# Patient Record
Sex: FEMALE | Race: WHITE | Employment: UNEMPLOYED | URBAN - METROPOLITAN AREA
[De-identification: names, ages, dates, MRNs, and addresses within clinical notes are randomized per-mention and may not be internally consistent; named-entity substitution may affect disease eponyms.]

---

## 2017-01-16 ENCOUNTER — APPOINTMENT (OUTPATIENT)
Dept: ULTRASOUND IMAGING | Facility: CLINIC | Age: 42
End: 2017-01-16
Attending: OBSTETRICS & GYNECOLOGY
Payer: COMMERCIAL

## 2017-01-16 PROCEDURE — 76819 FETAL BIOPHYS PROFIL W/O NST: CPT

## 2017-01-24 DIAGNOSIS — Z01.812 PRE-OPERATIVE LABORATORY EXAMINATION: Primary | ICD-10-CM

## 2017-01-24 RX ORDER — CALCIUM CARBONATE 500(1250)
500 TABLET ORAL 2 TIMES DAILY
COMMUNITY

## 2017-01-27 NOTE — PHARMACY-ADMISSION MEDICATION HISTORY
Admission medication history interview status for this patient is complete. See [facility] admission navigator for allergy information, prior to admission medications and immunization status.     Medication history interview source(s):Patient  Medication history resources (including written lists, pill bottles, clinic record):-  Primary pharmacy:-    PTA meds completed by pre-admitting nurse Karishma Kilpatrick and reviewed by pharmacy     Actions taken by pharmacist (provider contacted, etc):None     Additional medication history information:None    Medication reconciliation/reorder completed by provider prior to medication history? N/A    For patients on insulin therapy: NO    Prior to Admission medications    Medication Sig Last Dose Taking? Auth Provider   Cyanocobalamin (VITAMIN B 12 PO) Take by mouth daily  Yes Reported, Patient   calcium carbonate (OS-SANTOSH 500 MG [place]. [place]) 500 MG tablet Take 500 mg by mouth 2 times daily  Yes Reported, Patient   Ascorbic Acid (VITAMIN C PO) Take 500 mg by mouth daily   Yes Reported, Patient   Acetaminophen (TYLENOL PO) Take 325 mg by mouth every 8 hours as needed   Yes Reported, Patient   Prenatal Vit-Fe Fumarate-FA (PRENATAL MULTIVITAMIN  PLUS IRON) 27-0.8 MG TABS Take 1 tablet by mouth daily  Yes Reported, Patient

## 2017-01-31 ENCOUNTER — HOSPITAL ENCOUNTER (OUTPATIENT)
Dept: LAB | Facility: CLINIC | Age: 42
Discharge: HOME | End: 2017-01-31
Attending: OBSTETRICS & GYNECOLOGY | Admitting: OBSTETRICS & GYNECOLOGY
Payer: COMMERCIAL

## 2017-01-31 DIAGNOSIS — Z01.812 PRE-OPERATIVE LABORATORY EXAMINATION: ICD-10-CM

## 2017-01-31 LAB
ABO + RH BLD: NORMAL
ABO + RH BLD: NORMAL
BLD GP AB SCN SERPL QL: NORMAL
BLOOD BANK CMNT PATIENT-IMP: NORMAL
ERYTHROCYTE [DISTWIDTH] IN BLOOD BY AUTOMATED COUNT: 15.8 % (ref 10–15)
HCT VFR BLD AUTO: 38.1 % (ref 35–47)
HGB BLD-MCNC: 12.5 G/DL (ref 11.7–15.7)
MCH RBC QN AUTO: 27.7 PG (ref 26.5–33)
MCHC RBC AUTO-ENTMCNC: 32.8 G/DL (ref 31.5–36.5)
MCV RBC AUTO: 84 FL (ref 78–100)
PLATELET # BLD AUTO: 111 10E9/L (ref 150–450)
RBC # BLD AUTO: 4.52 10E12/L (ref 3.8–5.2)
SPECIMEN EXP DATE BLD: NORMAL
WBC # BLD AUTO: 11.1 10E9/L (ref 4–11)

## 2017-01-31 PROCEDURE — 36415 COLL VENOUS BLD VENIPUNCTURE: CPT | Performed by: OBSTETRICS & GYNECOLOGY

## 2017-01-31 PROCEDURE — 86901 BLOOD TYPING SEROLOGIC RH(D): CPT | Performed by: OBSTETRICS & GYNECOLOGY

## 2017-01-31 PROCEDURE — 86900 BLOOD TYPING SEROLOGIC ABO: CPT | Performed by: OBSTETRICS & GYNECOLOGY

## 2017-01-31 PROCEDURE — 86850 RBC ANTIBODY SCREEN: CPT | Performed by: OBSTETRICS & GYNECOLOGY

## 2017-01-31 PROCEDURE — 85027 COMPLETE CBC AUTOMATED: CPT | Performed by: OBSTETRICS & GYNECOLOGY

## 2017-02-01 ENCOUNTER — ANESTHESIA (OUTPATIENT)
Dept: OBGYN | Facility: CLINIC | Age: 42
End: 2017-02-01
Payer: COMMERCIAL

## 2017-02-01 ENCOUNTER — ANESTHESIA EVENT (OUTPATIENT)
Dept: OBGYN | Facility: CLINIC | Age: 42
End: 2017-02-01
Payer: COMMERCIAL

## 2017-02-01 ENCOUNTER — SURGERY (OUTPATIENT)
Age: 42
End: 2017-02-01

## 2017-02-01 ENCOUNTER — HOSPITAL ENCOUNTER (INPATIENT)
Facility: CLINIC | Age: 42
LOS: 4 days | Discharge: HOME | End: 2017-02-05
Attending: OBSTETRICS & GYNECOLOGY | Admitting: OBSTETRICS & GYNECOLOGY
Payer: COMMERCIAL

## 2017-02-01 DIAGNOSIS — Z86.59 HISTORY OF POSTPARTUM DEPRESSION: ICD-10-CM

## 2017-02-01 DIAGNOSIS — D62 ANEMIA DUE TO BLOOD LOSS, ACUTE: ICD-10-CM

## 2017-02-01 DIAGNOSIS — E66.9 OBESITY, UNSPECIFIED OBESITY SEVERITY, UNSPECIFIED OBESITY TYPE: ICD-10-CM

## 2017-02-01 DIAGNOSIS — O30.033 MONOCHORIONIC DIAMNIOTIC TWIN GESTATION IN THIRD TRIMESTER: ICD-10-CM

## 2017-02-01 DIAGNOSIS — O24.410 GDM, CLASS A1: ICD-10-CM

## 2017-02-01 DIAGNOSIS — F43.22 ADJUSTMENT DISORDER WITH ANXIOUS MOOD: ICD-10-CM

## 2017-02-01 DIAGNOSIS — O14.93 PRE-ECLAMPSIA, THIRD TRIMESTER: ICD-10-CM

## 2017-02-01 DIAGNOSIS — Z87.59 HISTORY OF POSTPARTUM DEPRESSION: ICD-10-CM

## 2017-02-01 LAB
ALT SERPL W P-5'-P-CCNC: 12 U/L (ref 0–50)
AST SERPL W P-5'-P-CCNC: 13 U/L (ref 0–45)
CREAT SERPL-MCNC: 0.67 MG/DL (ref 0.52–1.04)
ERYTHROCYTE [DISTWIDTH] IN BLOOD BY AUTOMATED COUNT: 15.7 % (ref 10–15)
GFR SERPL CREATININE-BSD FRML MDRD: NORMAL ML/MIN/1.7M2
GLUCOSE BLDC GLUCOMTR-MCNC: 104 MG/DL (ref 70–99)
GLUCOSE BLDC GLUCOMTR-MCNC: 95 MG/DL (ref 70–99)
HCT VFR BLD AUTO: 37.4 % (ref 35–47)
HGB BLD-MCNC: 12.1 G/DL (ref 11.7–15.7)
MCH RBC QN AUTO: 27.3 PG (ref 26.5–33)
MCHC RBC AUTO-ENTMCNC: 32.4 G/DL (ref 31.5–36.5)
MCV RBC AUTO: 84 FL (ref 78–100)
PLATELET # BLD AUTO: 132 10E9/L (ref 150–450)
RBC # BLD AUTO: 4.44 10E12/L (ref 3.8–5.2)
T PALLIDUM IGG+IGM SER QL: NEGATIVE
URATE SERPL-MCNC: 6.6 MG/DL (ref 2.6–6)
WBC # BLD AUTO: 12 10E9/L (ref 4–11)

## 2017-02-01 PROCEDURE — 27210794 ZZH OR GENERAL SUPPLY STERILE: Performed by: OBSTETRICS & GYNECOLOGY

## 2017-02-01 PROCEDURE — 88307 TISSUE EXAM BY PATHOLOGIST: CPT | Performed by: OBSTETRICS & GYNECOLOGY

## 2017-02-01 PROCEDURE — 84460 ALANINE AMINO (ALT) (SGPT): CPT | Performed by: OBSTETRICS & GYNECOLOGY

## 2017-02-01 PROCEDURE — 25800025 ZZH RX 258: Performed by: OBSTETRICS & GYNECOLOGY

## 2017-02-01 PROCEDURE — 27210995 ZZH RX 272: Performed by: OBSTETRICS & GYNECOLOGY

## 2017-02-01 PROCEDURE — 25000125 ZZHC RX 250: Performed by: OBSTETRICS & GYNECOLOGY

## 2017-02-01 PROCEDURE — 25000132 ZZH RX MED GY IP 250 OP 250 PS 637: Performed by: OBSTETRICS & GYNECOLOGY

## 2017-02-01 PROCEDURE — 84450 TRANSFERASE (AST) (SGOT): CPT | Performed by: OBSTETRICS & GYNECOLOGY

## 2017-02-01 PROCEDURE — 25000128 H RX IP 250 OP 636: Performed by: OBSTETRICS & GYNECOLOGY

## 2017-02-01 PROCEDURE — 25000125 ZZHC RX 250: Performed by: NURSE ANESTHETIST, CERTIFIED REGISTERED

## 2017-02-01 PROCEDURE — 25000128 H RX IP 250 OP 636: Performed by: NURSE ANESTHETIST, CERTIFIED REGISTERED

## 2017-02-01 PROCEDURE — 85027 COMPLETE CBC AUTOMATED: CPT | Performed by: OBSTETRICS & GYNECOLOGY

## 2017-02-01 PROCEDURE — 37000009 ZZH ANESTHESIA TECHNICAL FEE, EACH ADDTL 15 MIN: Performed by: OBSTETRICS & GYNECOLOGY

## 2017-02-01 PROCEDURE — 37000008 ZZH ANESTHESIA TECHNICAL FEE, 1ST 30 MIN: Performed by: OBSTETRICS & GYNECOLOGY

## 2017-02-01 PROCEDURE — 36000058 ZZH SURGERY LEVEL 3 EA 15 ADDTL MIN: Performed by: OBSTETRICS & GYNECOLOGY

## 2017-02-01 PROCEDURE — 71000012 ZZH RECOVERY PHASE 1 LEVEL 1 FIRST HR: Performed by: OBSTETRICS & GYNECOLOGY

## 2017-02-01 PROCEDURE — 82565 ASSAY OF CREATININE: CPT | Performed by: OBSTETRICS & GYNECOLOGY

## 2017-02-01 PROCEDURE — 88307 TISSUE EXAM BY PATHOLOGIST: CPT | Mod: 26 | Performed by: OBSTETRICS & GYNECOLOGY

## 2017-02-01 PROCEDURE — 71000013 ZZH RECOVERY PHASE 1 LEVEL 1 EA ADDTL HR: Performed by: OBSTETRICS & GYNECOLOGY

## 2017-02-01 PROCEDURE — 00000146 ZZHCL STATISTIC GLUCOSE BY METER IP

## 2017-02-01 PROCEDURE — 86780 TREPONEMA PALLIDUM: CPT | Performed by: OBSTETRICS & GYNECOLOGY

## 2017-02-01 PROCEDURE — 25000125 ZZHC RX 250: Performed by: ANESTHESIOLOGY

## 2017-02-01 PROCEDURE — S0077 INJECTION, CLINDAMYCIN PHOSP: HCPCS | Performed by: OBSTETRICS & GYNECOLOGY

## 2017-02-01 PROCEDURE — 84550 ASSAY OF BLOOD/URIC ACID: CPT | Performed by: OBSTETRICS & GYNECOLOGY

## 2017-02-01 PROCEDURE — 12000029 ZZH R&B OB INTERMEDIATE

## 2017-02-01 PROCEDURE — 36000056 ZZH SURGERY LEVEL 3 1ST 30 MIN: Performed by: OBSTETRICS & GYNECOLOGY

## 2017-02-01 PROCEDURE — 40000086 ZZH STATISTIC IP LACTATION SERVICES 46-60 MIN

## 2017-02-01 RX ORDER — KETOROLAC TROMETHAMINE 30 MG/ML
INJECTION, SOLUTION INTRAMUSCULAR; INTRAVENOUS PRN
Status: DISCONTINUED | OUTPATIENT
Start: 2017-02-01 | End: 2017-02-01

## 2017-02-01 RX ORDER — HYDROCORTISONE 2.5 %
CREAM (GRAM) TOPICAL 3 TIMES DAILY PRN
Status: DISCONTINUED | OUTPATIENT
Start: 2017-02-01 | End: 2017-02-05 | Stop reason: HOSPADM

## 2017-02-01 RX ORDER — SODIUM CHLORIDE, SODIUM LACTATE, POTASSIUM CHLORIDE, CALCIUM CHLORIDE 600; 310; 30; 20 MG/100ML; MG/100ML; MG/100ML; MG/100ML
INJECTION, SOLUTION INTRAVENOUS CONTINUOUS
Status: DISCONTINUED | OUTPATIENT
Start: 2017-02-01 | End: 2017-02-01

## 2017-02-01 RX ORDER — NALOXONE HYDROCHLORIDE 0.4 MG/ML
.1-.4 INJECTION, SOLUTION INTRAMUSCULAR; INTRAVENOUS; SUBCUTANEOUS
Status: DISCONTINUED | OUTPATIENT
Start: 2017-02-01 | End: 2017-02-02 | Stop reason: CLARIF

## 2017-02-01 RX ORDER — NALOXONE HYDROCHLORIDE 0.4 MG/ML
.1-.4 INJECTION, SOLUTION INTRAMUSCULAR; INTRAVENOUS; SUBCUTANEOUS
Status: ACTIVE | OUTPATIENT
Start: 2017-02-01 | End: 2017-02-02

## 2017-02-01 RX ORDER — KETOROLAC TROMETHAMINE 30 MG/ML
15 INJECTION, SOLUTION INTRAMUSCULAR; INTRAVENOUS EVERY 6 HOURS
Status: COMPLETED | OUTPATIENT
Start: 2017-02-01 | End: 2017-02-01

## 2017-02-01 RX ORDER — DEXTROSE, SODIUM CHLORIDE, SODIUM LACTATE, POTASSIUM CHLORIDE, AND CALCIUM CHLORIDE 5; .6; .31; .03; .02 G/100ML; G/100ML; G/100ML; G/100ML; G/100ML
INJECTION, SOLUTION INTRAVENOUS CONTINUOUS
Status: DISCONTINUED | OUTPATIENT
Start: 2017-02-01 | End: 2017-02-05 | Stop reason: HOSPADM

## 2017-02-01 RX ORDER — NALOXONE HYDROCHLORIDE 0.4 MG/ML
.1-.4 INJECTION, SOLUTION INTRAMUSCULAR; INTRAVENOUS; SUBCUTANEOUS
Status: DISCONTINUED | OUTPATIENT
Start: 2017-02-01 | End: 2017-02-05 | Stop reason: HOSPADM

## 2017-02-01 RX ORDER — PROMETHAZINE HYDROCHLORIDE 25 MG/ML
6.25 INJECTION, SOLUTION INTRAMUSCULAR; INTRAVENOUS
Status: DISCONTINUED | OUTPATIENT
Start: 2017-02-01 | End: 2017-02-02 | Stop reason: CLARIF

## 2017-02-01 RX ORDER — METOCLOPRAMIDE HYDROCHLORIDE 5 MG/ML
10 INJECTION INTRAMUSCULAR; INTRAVENOUS EVERY 6 HOURS PRN
Status: DISCONTINUED | OUTPATIENT
Start: 2017-02-01 | End: 2017-02-05 | Stop reason: HOSPADM

## 2017-02-01 RX ORDER — ONDANSETRON 4 MG/1
4 TABLET, ORALLY DISINTEGRATING ORAL EVERY 30 MIN PRN
Status: DISCONTINUED | OUTPATIENT
Start: 2017-02-01 | End: 2017-02-02 | Stop reason: CLARIF

## 2017-02-01 RX ORDER — ONDANSETRON 2 MG/ML
4 INJECTION INTRAMUSCULAR; INTRAVENOUS EVERY 6 HOURS PRN
Status: DISCONTINUED | OUTPATIENT
Start: 2017-02-01 | End: 2017-02-05 | Stop reason: HOSPADM

## 2017-02-01 RX ORDER — AMOXICILLIN 250 MG
1-2 CAPSULE ORAL 2 TIMES DAILY
Status: DISCONTINUED | OUTPATIENT
Start: 2017-02-01 | End: 2017-02-05 | Stop reason: HOSPADM

## 2017-02-01 RX ORDER — LIDOCAINE 40 MG/G
CREAM TOPICAL
Status: DISCONTINUED | OUTPATIENT
Start: 2017-02-01 | End: 2017-02-05 | Stop reason: HOSPADM

## 2017-02-01 RX ORDER — OXYCODONE HYDROCHLORIDE 5 MG/1
5-10 TABLET ORAL
Status: DISCONTINUED | OUTPATIENT
Start: 2017-02-01 | End: 2017-02-02

## 2017-02-01 RX ORDER — HYDROMORPHONE HYDROCHLORIDE 1 MG/ML
.3-.5 INJECTION, SOLUTION INTRAMUSCULAR; INTRAVENOUS; SUBCUTANEOUS EVERY 10 MIN PRN
Status: DISCONTINUED | OUTPATIENT
Start: 2017-02-01 | End: 2017-02-02 | Stop reason: CLARIF

## 2017-02-01 RX ORDER — OXYTOCIN/0.9 % SODIUM CHLORIDE 30/500 ML
100 PLASTIC BAG, INJECTION (ML) INTRAVENOUS CONTINUOUS
Status: DISCONTINUED | OUTPATIENT
Start: 2017-02-01 | End: 2017-02-05 | Stop reason: HOSPADM

## 2017-02-01 RX ORDER — MORPHINE SULFATE 1 MG/ML
INJECTION, SOLUTION EPIDURAL; INTRATHECAL; INTRAVENOUS PRN
Status: DISCONTINUED | OUTPATIENT
Start: 2017-02-01 | End: 2017-02-01

## 2017-02-01 RX ORDER — NICOTINE POLACRILEX 4 MG
15-30 LOZENGE BUCCAL
Status: DISCONTINUED | OUTPATIENT
Start: 2017-02-01 | End: 2017-02-05 | Stop reason: HOSPADM

## 2017-02-01 RX ORDER — CLINDAMYCIN PHOSPHATE 900 MG/50ML
900 INJECTION, SOLUTION INTRAVENOUS
Status: COMPLETED | OUTPATIENT
Start: 2017-02-01 | End: 2017-02-01

## 2017-02-01 RX ORDER — ACETAMINOPHEN 325 MG/1
975 TABLET ORAL EVERY 8 HOURS
Status: COMPLETED | OUTPATIENT
Start: 2017-02-01 | End: 2017-02-04

## 2017-02-01 RX ORDER — FENTANYL CITRATE 50 UG/ML
25-50 INJECTION, SOLUTION INTRAMUSCULAR; INTRAVENOUS
Status: DISCONTINUED | OUTPATIENT
Start: 2017-02-01 | End: 2017-02-02 | Stop reason: CLARIF

## 2017-02-01 RX ORDER — FENTANYL CITRATE 50 UG/ML
25-50 INJECTION, SOLUTION INTRAMUSCULAR; INTRAVENOUS
Status: DISCONTINUED | OUTPATIENT
Start: 2017-02-01 | End: 2017-02-01 | Stop reason: HOSPADM

## 2017-02-01 RX ORDER — OXYTOCIN 10 [USP'U]/ML
10 INJECTION, SOLUTION INTRAMUSCULAR; INTRAVENOUS
Status: DISCONTINUED | OUTPATIENT
Start: 2017-02-01 | End: 2017-02-05 | Stop reason: HOSPADM

## 2017-02-01 RX ORDER — ACETAMINOPHEN 325 MG/1
650 TABLET ORAL EVERY 4 HOURS PRN
Status: DISCONTINUED | OUTPATIENT
Start: 2017-02-04 | End: 2017-02-05 | Stop reason: HOSPADM

## 2017-02-01 RX ORDER — PROCHLORPERAZINE 25 MG
25 SUPPOSITORY, RECTAL RECTAL EVERY 12 HOURS PRN
Status: DISCONTINUED | OUTPATIENT
Start: 2017-02-01 | End: 2017-02-05 | Stop reason: HOSPADM

## 2017-02-01 RX ORDER — OXYTOCIN/0.9 % SODIUM CHLORIDE 30/500 ML
340 PLASTIC BAG, INJECTION (ML) INTRAVENOUS CONTINUOUS PRN
Status: DISCONTINUED | OUTPATIENT
Start: 2017-02-01 | End: 2017-02-05 | Stop reason: HOSPADM

## 2017-02-01 RX ORDER — FENTANYL CITRATE 50 UG/ML
INJECTION, SOLUTION INTRAMUSCULAR; INTRAVENOUS PRN
Status: DISCONTINUED | OUTPATIENT
Start: 2017-02-01 | End: 2017-02-01

## 2017-02-01 RX ORDER — BUPIVACAINE HYDROCHLORIDE 7.5 MG/ML
INJECTION, SOLUTION INTRASPINAL PRN
Status: DISCONTINUED | OUTPATIENT
Start: 2017-02-01 | End: 2017-02-01

## 2017-02-01 RX ORDER — IBUPROFEN 400 MG/1
400-800 TABLET, FILM COATED ORAL EVERY 6 HOURS PRN
Status: DISCONTINUED | OUTPATIENT
Start: 2017-02-02 | End: 2017-02-02

## 2017-02-01 RX ORDER — HYDROMORPHONE HYDROCHLORIDE 1 MG/ML
.3-.5 INJECTION, SOLUTION INTRAMUSCULAR; INTRAVENOUS; SUBCUTANEOUS EVERY 30 MIN PRN
Status: DISCONTINUED | OUTPATIENT
Start: 2017-02-01 | End: 2017-02-05 | Stop reason: HOSPADM

## 2017-02-01 RX ORDER — ACETAMINOPHEN 325 MG/1
975 TABLET ORAL ONCE
Status: COMPLETED | OUTPATIENT
Start: 2017-02-01 | End: 2017-02-01

## 2017-02-01 RX ORDER — BISACODYL 10 MG
10 SUPPOSITORY, RECTAL RECTAL DAILY PRN
Status: DISCONTINUED | OUTPATIENT
Start: 2017-02-03 | End: 2017-02-05 | Stop reason: HOSPADM

## 2017-02-01 RX ORDER — NALBUPHINE HYDROCHLORIDE 10 MG/ML
2.5-5 INJECTION, SOLUTION INTRAMUSCULAR; INTRAVENOUS; SUBCUTANEOUS EVERY 6 HOURS PRN
Status: DISCONTINUED | OUTPATIENT
Start: 2017-02-01 | End: 2017-02-02 | Stop reason: CLARIF

## 2017-02-01 RX ORDER — DEXTROSE MONOHYDRATE 25 G/50ML
25-50 INJECTION, SOLUTION INTRAVENOUS
Status: DISCONTINUED | OUTPATIENT
Start: 2017-02-01 | End: 2017-02-05 | Stop reason: HOSPADM

## 2017-02-01 RX ORDER — ONDANSETRON 2 MG/ML
4 INJECTION INTRAMUSCULAR; INTRAVENOUS EVERY 30 MIN PRN
Status: DISCONTINUED | OUTPATIENT
Start: 2017-02-01 | End: 2017-02-02 | Stop reason: CLARIF

## 2017-02-01 RX ORDER — DIPHENHYDRAMINE HCL 25 MG
25 CAPSULE ORAL EVERY 6 HOURS PRN
Status: DISCONTINUED | OUTPATIENT
Start: 2017-02-01 | End: 2017-02-05 | Stop reason: HOSPADM

## 2017-02-01 RX ORDER — ALBUTEROL SULFATE 0.83 MG/ML
2.5 SOLUTION RESPIRATORY (INHALATION) EVERY 4 HOURS PRN
Status: DISCONTINUED | OUTPATIENT
Start: 2017-02-01 | End: 2017-02-01 | Stop reason: HOSPADM

## 2017-02-01 RX ORDER — MEPERIDINE HYDROCHLORIDE 25 MG/ML
12.5 INJECTION INTRAMUSCULAR; INTRAVENOUS; SUBCUTANEOUS
Status: DISCONTINUED | OUTPATIENT
Start: 2017-02-01 | End: 2017-02-02 | Stop reason: CLARIF

## 2017-02-01 RX ORDER — LANOLIN 100 %
OINTMENT (GRAM) TOPICAL
Status: DISCONTINUED | OUTPATIENT
Start: 2017-02-01 | End: 2017-02-05 | Stop reason: HOSPADM

## 2017-02-01 RX ORDER — SIMETHICONE 80 MG
80 TABLET,CHEWABLE ORAL 4 TIMES DAILY PRN
Status: DISCONTINUED | OUTPATIENT
Start: 2017-02-01 | End: 2017-02-05 | Stop reason: HOSPADM

## 2017-02-01 RX ORDER — EPHEDRINE SULFATE 50 MG/ML
5 INJECTION, SOLUTION INTRAMUSCULAR; INTRAVENOUS; SUBCUTANEOUS
Status: DISCONTINUED | OUTPATIENT
Start: 2017-02-01 | End: 2017-02-02 | Stop reason: CLARIF

## 2017-02-01 RX ORDER — MAGNESIUM HYDROXIDE 1200 MG/15ML
LIQUID ORAL PRN
Status: DISCONTINUED | OUTPATIENT
Start: 2017-02-01 | End: 2017-02-05 | Stop reason: HOSPADM

## 2017-02-01 RX ORDER — SODIUM CHLORIDE, SODIUM LACTATE, POTASSIUM CHLORIDE, CALCIUM CHLORIDE 600; 310; 30; 20 MG/100ML; MG/100ML; MG/100ML; MG/100ML
1000 INJECTION, SOLUTION INTRAVENOUS CONTINUOUS
Status: DISCONTINUED | OUTPATIENT
Start: 2017-02-01 | End: 2017-02-02 | Stop reason: CLARIF

## 2017-02-01 RX ORDER — LIDOCAINE 40 MG/G
CREAM TOPICAL
Status: DISCONTINUED | OUTPATIENT
Start: 2017-02-01 | End: 2017-02-02 | Stop reason: CLARIF

## 2017-02-01 RX ORDER — ONDANSETRON 2 MG/ML
INJECTION INTRAMUSCULAR; INTRAVENOUS PRN
Status: DISCONTINUED | OUTPATIENT
Start: 2017-02-01 | End: 2017-02-01

## 2017-02-01 RX ORDER — DIPHENHYDRAMINE HYDROCHLORIDE 50 MG/ML
25 INJECTION INTRAMUSCULAR; INTRAVENOUS EVERY 6 HOURS PRN
Status: DISCONTINUED | OUTPATIENT
Start: 2017-02-01 | End: 2017-02-05 | Stop reason: HOSPADM

## 2017-02-01 RX ADMIN — SODIUM CHLORIDE, SODIUM LACTATE, POTASSIUM CHLORIDE, CALCIUM CHLORIDE AND DEXTROSE MONOHYDRATE: 5; 600; 310; 30; 20 INJECTION, SOLUTION INTRAVENOUS at 17:37

## 2017-02-01 RX ADMIN — SENNOSIDES AND DOCUSATE SODIUM 1 TABLET: 8.6; 5 TABLET ORAL at 20:27

## 2017-02-01 RX ADMIN — KETOROLAC TROMETHAMINE 15 MG: 30 INJECTION, SOLUTION INTRAMUSCULAR at 15:19

## 2017-02-01 RX ADMIN — ACETAMINOPHEN 975 MG: 325 TABLET, FILM COATED ORAL at 16:14

## 2017-02-01 RX ADMIN — PHENYLEPHRINE HYDROCHLORIDE 100 MCG: 10 INJECTION, SOLUTION INTRAMUSCULAR; INTRAVENOUS; SUBCUTANEOUS at 08:43

## 2017-02-01 RX ADMIN — SODIUM CHLORIDE, POTASSIUM CHLORIDE, SODIUM LACTATE AND CALCIUM CHLORIDE: 600; 310; 30; 20 INJECTION, SOLUTION INTRAVENOUS at 08:19

## 2017-02-01 RX ADMIN — GENTAMICIN SULFATE 140 MG: 40 INJECTION, SOLUTION INTRAMUSCULAR; INTRAVENOUS at 08:28

## 2017-02-01 RX ADMIN — CLINDAMYCIN PHOSPHATE 900 MG: 18 INJECTION, SOLUTION INTRAVENOUS at 08:19

## 2017-02-01 RX ADMIN — BUPIVACAINE HYDROCHLORIDE IN DEXTROSE 13.5 MG: 7.5 INJECTION, SOLUTION SUBARACHNOID at 08:22

## 2017-02-01 RX ADMIN — FENTANYL CITRATE 20 MCG: 50 INJECTION, SOLUTION INTRAMUSCULAR; INTRAVENOUS at 08:22

## 2017-02-01 RX ADMIN — ONDANSETRON 4 MG: 2 INJECTION INTRAMUSCULAR; INTRAVENOUS at 08:43

## 2017-02-01 RX ADMIN — OXYCODONE HYDROCHLORIDE 5 MG: 5 TABLET ORAL at 20:27

## 2017-02-01 RX ADMIN — SODIUM CHLORIDE, POTASSIUM CHLORIDE, SODIUM LACTATE AND CALCIUM CHLORIDE: 600; 310; 30; 20 INJECTION, SOLUTION INTRAVENOUS at 08:34

## 2017-02-01 RX ADMIN — OXYCODONE HYDROCHLORIDE 5 MG: 5 TABLET ORAL at 21:07

## 2017-02-01 RX ADMIN — HYDROMORPHONE HYDROCHLORIDE 0.5 MG: 1 INJECTION, SOLUTION INTRAMUSCULAR; INTRAVENOUS; SUBCUTANEOUS at 13:24

## 2017-02-01 RX ADMIN — HYDROMORPHONE HYDROCHLORIDE 0.4 MG: 1 INJECTION, SOLUTION INTRAMUSCULAR; INTRAVENOUS; SUBCUTANEOUS at 12:14

## 2017-02-01 RX ADMIN — OXYTOCIN-SODIUM CHLORIDE 0.9% IV SOLN 30 UNIT/500ML: 30-0.9/5 SOLUTION at 08:48

## 2017-02-01 RX ADMIN — ACETAMINOPHEN 975 MG: 325 TABLET, FILM COATED ORAL at 08:01

## 2017-02-01 RX ADMIN — OXYTOCIN-SODIUM CHLORIDE 0.9% IV SOLN 30 UNIT/500ML 100 ML/HR: 30-0.9/5 SOLUTION at 11:25

## 2017-02-01 RX ADMIN — KETOROLAC TROMETHAMINE 15 MG: 30 INJECTION, SOLUTION INTRAMUSCULAR at 09:08

## 2017-02-01 RX ADMIN — SODIUM CHLORIDE 1100 ML: 900 IRRIGANT IRRIGATION at 09:00

## 2017-02-01 RX ADMIN — SODIUM CHLORIDE, POTASSIUM CHLORIDE, SODIUM LACTATE AND CALCIUM CHLORIDE 1000 ML: 600; 310; 30; 20 INJECTION, SOLUTION INTRAVENOUS at 07:44

## 2017-02-01 RX ADMIN — SODIUM CITRATE AND CITRIC ACID MONOHYDRATE 30 ML: 500; 334 SOLUTION ORAL at 08:01

## 2017-02-01 RX ADMIN — KETOROLAC TROMETHAMINE 15 MG: 30 INJECTION, SOLUTION INTRAMUSCULAR at 21:06

## 2017-02-01 RX ADMIN — MORPHINE SULFATE 0.4 MG: 1 INJECTION EPIDURAL; INTRATHECAL; INTRAVENOUS at 08:22

## 2017-02-01 NOTE — ANESTHESIA PREPROCEDURE EVALUATION
PAC NOTE:       ANESTHESIA PRE EVALUATION:  Anesthesia Evaluation     .        ROS/MED HX    ENT/Pulmonary:  - neg pulmonary ROS     Neurologic:  - neg neurologic ROS     Cardiovascular:     (+) hypertension----. : . . . :. .       METS/Exercise Tolerance:     Hematologic:  - neg hematologic  ROS       Musculoskeletal:  - neg musculoskeletal ROS       GI/Hepatic:  - neg GI/hepatic ROS       Renal/Genitourinary:  - ROS Renal section negative       Endo:  - neg endo ROS       Psychiatric:  - neg psychiatric ROS       Infectious Disease:  - neg infectious disease ROS       Malignancy:         Other: Comment: Pre-eclampsia  Platelets stable    .Lab Test        01/31/17     10/08/14                       1028          0030          WBC          11.1*        12.0*         HGB          12.5         12.8          MCV          84           84            PLT          111*         218           INR           --          0.99           Lab Test        10/08/14                       0030          NA           138           POTASSIUM    3.5           CHLORIDE     105           CO2          26            BUN          13            CR           0.84          ANIONGAP     7             SANTOSH          9.1           GLC          100*             (+) Possibly pregnant              Physical Exam  Normal systems: cardiovascular and pulmonary    Airway   Mallampati: II    Dental     Cardiovascular   Rhythm and rate: regular and normal      Pulmonary    breath sounds clear to auscultation             Anesthesia Plan      History & Physical Review  History and physical reviewed and following examination; no interval change.    ASA Status:  3 .        Plan for Spinal          Postoperative Care      Consents                            .

## 2017-02-01 NOTE — PLAN OF CARE
Problem: Goal Outcome Summary  Goal: Goal Outcome Summary  Outcome: Improving  Assumed care at 1130. Patient has been very anxious since transfer. Complained of nausea with first sips of fluid, encouraged her to take it slow, has been okay since, tolerating liquids. Pain being managed with toradol and dilaudid. Scant amount of marked drainage on incision dressing. Using ice for comfort. Padgett draining concentrated urine. Second bag of pitocin infusing. Breastfeeding twins.

## 2017-02-01 NOTE — IP AVS SNAPSHOT
[facility]    [address]    [facility] [address]    Phone:  [phone]    Fax:  [phone]                                       After Visit Summary   2/1/2017    Rocky Lam    MRN: [id number]           After Visit Summary Signature Page     I have received my discharge instructions, and my questions have been answered. I have discussed any challenges I see with this plan with the nurse or doctor.    ..........................................................................................................................................  Patient/Patient Representative Signature      ..........................................................................................................................................  Patient Representative Print Name and Relationship to Patient    ..................................................               ................................................  Date                                            Time    ..........................................................................................................................................  Reviewed by Signature/Title    ...................................................              ..............................................  Date                                                            Time

## 2017-02-01 NOTE — OP NOTE
[facility]   Operative Note     Date of Procedure: 2017    Preoperative diagnosis: 36w6d gestation, mono-di twin pregnancy, gestational diabetes, preeclampsia, thrombocytopenia, obesity, multipartity declines tubal ligation, AMA, irritable bowel syndrome, OCD, anxiety, PTSD, history of THC use    Post operative diagnosis: same    Procedure: primary low transverse  section with double layer closure of uterine incision    Surgeon: Karishma Huerta MD  Assistant surgeon: Harjinder Quiros MD    Anesthesia: Spinal, Dr. Whiteside    Indication: Rocky is a 41 year old  who presented at 36w6d gestation with mono-di twin pregnancy and recommendation for delivery at 36-37 weeks, per maternal fetal medicine. Pregnancy complicated by AMA, preeclampsia, gestational diabetes, and mood disorders.     EBL: 700 mL    Specimens: placenta, fetal cord blood    Findings: Baby B (first delivered/Baby 1): viable, cephalic, male infant. 6 lb, 5.6 oz (2800gm). Agars of 8 at 1 minute and 9 at 5 minutes. This baby appeared to have a small jaw with recessed area at the TMJ. Delayed cord clamping of less than 30 seconds. Baby A (second delivered/Baby 2): viable, cephalic, male infant, 5 lb 15.9 oz (2720 gm). Apgars of 8 at 1 and 9 at 5 minutes. Normal uterus tubes and ovaries.     Procedure: Patient was taken to the operating room. Anesthesia was dosed and found to be adequate. Patient was prepared and draped in the normal sterile fashion in the supine position with a leftward tilt. Antibiotics were given. Surgical time out was performed. Anesthesia relief was confirmed. Jatinder Finley skin incision was made with the scalpel and carried through to the underlying layer of fascia with the Bovie. Fascia was incised in the midline and this incision was extended laterally with caudal-cephalad traction. Rectus muscles were seperated in the midline with blunt lateral force. Peritoneum was entered bluntly with the  digit. This peritoneal incision was extended with lateral traction. Bladder blade was inserted and vesicouterine peritoneum was inspected. It was entered sharply with the Metzenbaum scissors and extended laterally with the scissors. Bladder flap was created digitally. Bladder blade was reinserted. Lower uterine segment was incised in a transverse fashion with the scalpel. This incision was continued to the uterine cavity and the uterine cavity was entered with the digit. Uterine incision was extended with caudal-cephalad traction. Viable infant was delivered atraumatically from a cephalic position. Nose and mouth were suctioned with bulb suctioning. Cord was clamped and cut. Infant was handed off the the waiting NICU staff. Second baby was then delivered atraumatically from a cephalic position and handed off to the waiting NICU staff.. Placenta was delivered spontaneously and sent to pathology. Uterus was exteriorized and wiped clean of all clot and debris with a moist sponge. Uterine incision was closed with 0-Vicryl in a running fashion. An imbricating second layer was created with 0-Vicryl. Inspection of the uterine incision demonstrated hemostasis. The uterus was returned to the abdomen. The bladder blade was reinserted and prolonged inpsection of the uterine incision again demonstrated hemostasis. Gutters were wiped clean of all clot and debris. The rectus muscles were inspected and noted to be hemostatic with use of electrocautery. The fascia was re-approximated with 0-Vicryl in a running fashion. Subcutaneous space was inspected and hemostasis was achieved with electrocautery. Subcutaneous space was re approximated with 2-0 Vicryl. Skin was closed with 4-0 Monocryl in a subcuticular fashion. Sponge, lap and needle counts were correct times two as counted and reported by the nursing staff.

## 2017-02-01 NOTE — IP AVS SNAPSHOT
MRN:[id number]                      After Visit Summary   2/1/2017    Rocky Lam    MRN: [id number]           Thank you!     Thank you for choosing [facility] for your care. Our goal is always to provide you with excellent care. Hearing back from our patients is one way we can continue to improve our services. Please take a few minutes to complete the written survey that you may receive in the mail after you visit. If you would like to speak to someone directly about your visit please contact Patient Relations at [phone]. Thank you!          Patient Information     Date Of Birth          1975        About your hospital stay     You were admitted on:  February 1, 2017 You last received care in the:  [facility]    You were discharged on:  February 5, 2017       Who to Call     For medical emergencies, please call 911.  For non-urgent questions about your medical care, please call your primary care provider or clinic, None  For questions related to your surgery, please call your surgery clinic        Attending Provider     Provider    Karishma Huerta MD       Primary Care Provider    [facility]                Further instructions from your care team            Discharge Instructions and Follow-Up:    Discharge diet:  Regular    Discharge activity:  No driving while using narcotics or for 1 week.  No heavy lifting greater than 15 pounds for 6 week(s)  Pelvic rest: abstain from intercourse and do not use tampons for 6 week(s)    Discharge follow-up:  Follow up with Dr. Huerta in 1-2 weeks and in 6 weeks. Continue counseling with [facility].    6 week glucola testing.     Outpatient therapy:  None     Lines and drains:  None     Wound care:  Keep clean and dry. Remove any steri strips in 1 week.    Other instructions:  Call 911 or present to ED for any thoughts of harming self or others. Call 911 if in any danger of harm  of self or children by others.                Postop  Birth Instructions  Lactation Phone Number: [phone]      Activity       Do not lift more than 10 pounds for 6 weeks after surgery.  Ask family and friends for help when you need it.    No driving until you have stopped taking your pain medications (usually two weeks after surgery).    No heavy exercise or activity for 6 weeks.  Don't do anything that will put a strain on your surgery site.    Don't strain when using the toilet.  Your care team may prescribe a stool softener if you have problems with your bowel movements.     To care for your incision:       Keep the incision clean and dry.    Do not soak your incision in water. No swimming or hot tubs until it has fully healed. You may soak in the bathtub if the water level is below your incision.    Do not use peroxide, gel, cream, lotion, or ointment on your incision.    Adjust your clothes to avoid pressure on your surgery site (check the elastic in your underwear for example).     You may see a small amount of clear or pink drainage and this is normal.  Check with your health care provider:       If the drainage increases or has an odor.    If the incision reddens, you have swelling, or develop a rash.    If you have increased pain and the medicine we prescribed doesn't help.    If you have a fever above 100.4 F (38 C) with or without chills when placing thermometer under your tongue.   The area around your incision (surgery wound), will feel numb.  This is normal. The numbness should go away in less than a year.     Keep your hands clean:  Always wash your hands before touching your incision (surgery wound). This helps reduce your risk of infection. If your hands aren't dirty, you may use an alcohol hand-rub to clean your hands. Keep your nails clean and short.    Call your healthcare provider if you have any of these symptoms:       You soak a sanitary pad with blood within 1 hour, or you see  "blood clots larger than a golf ball.    Bleeding that lasts more than 6 weeks.    Vaginal discharge that smells bad.    Severe pain, cramping or tenderness in your lower belly area.    A need to urinate more frequently (use the toilet more often), more urgently (use the toilet very quickly), or it burns when you urinate.    Nausea and vomiting.    Redness, swelling or pain around a vein in your leg.    Problems breastfeeding or a red or painful area on your breast.    Chest pain and cough or are gasping for air.    Problems with coping with sadness, anxiety or depression. If you have concerns about hurting yourself or the baby, call your provider immediately.      You have questions or concerns after you return home.                    Pending Results     No orders found from 1/31/2017 to 2/2/2017.            Statement of Approval     Ordered          02/05/17 1043  I have reviewed and agree with all the recommendations and orders detailed in this document.   EFFECTIVE NOW     Approved and electronically signed by:  Karishma Huerta MD             Admission Information        Provider Department Dept Phone    2/1/2017 Karishma Huerta MD  Postpartum [phone]      Your Vitals Were     Blood Pressure Pulse Temperature    136/84 mmHg 88 97.8  F (36.6  C) (Oral)    Respirations Height Weight    20 1.727 m (5' 8\") 124.739 kg (275 lb)    BMI (Body Mass Index) Pulse Oximetry       41.82 kg/m2 98%       MyChart Information     [facility] lets you send messages to your doctor, view your test results, renew your prescriptions, schedule appointments and more. To sign up, go to [email or web address] . Click on \"Log in\" on the left side of the screen, which will take you to the Welcome page. Then click on \"Sign up Now\" on the right side of the page.     You will be asked to enter the access code listed below, as well as some personal information. Please follow the directions to create your username " and password.     Your access code is: [email or web address]  Expires: 2/10/2017 10:41 PM     Your access code will  in 90 days. If you need help or a new code, please call your [facility] clinic or [phone].        Care EveryWhere ID     This is your Care EveryWhere ID. This could be used by other organizations to access your [facility] medical records  [id number]           Review of your medicines      START taking        Dose / Directions    ibuprofen 800 MG tablet   Commonly known as:  ADVIL/MOTRIN   Used for:   delivery delivered        Dose:  800 mg   Take 1 tablet (800 mg) by mouth every 8 hours as needed for other (cramping)   Quantity:  30 tablet   Refills:  1       LORazepam 0.5 MG tablet   Commonly known as:  ATIVAN   Used for:  Adjustment disorder with anxious mood        Dose:  0.5 mg   Take 1 tablet (0.5 mg) by mouth every 8 hours as needed for anxiety (avoid breastfeeding for 2-3 hours after dosing)   Quantity:  10 tablet   Refills:  0       oxyCODONE 5 MG IR tablet   Commonly known as:  ROXICODONE   Used for:   delivery delivered        Dose:  5-10 mg   Take 1-2 tablets (5-10 mg) by mouth every 4 hours as needed for moderate to severe pain or pain   Quantity:  30 tablet   Refills:  0       senna-docusate 8.6-50 MG per tablet   Commonly known as:  SENOKOT-S;PERICOLACE   Used for:   delivery delivered        Dose:  1-2 tablet   Take 1-2 tablets by mouth 2 times daily as needed for constipation   Quantity:  30 tablet   Refills:  1       sertraline 50 MG tablet   Commonly known as:  ZOLOFT   Used for:  History of postpartum depression        Dose:  50 mg   Take 1 tablet (50 mg) by mouth daily   Quantity:  90 tablet   Refills:  3         CONTINUE these medicines which have NOT CHANGED        Dose / Directions    calcium carbonate 500 MG tablet   Commonly known as:  OS-SANTOSH 500 mg [place]        Dose:  500 mg   Take 500 mg by mouth 2 times daily   Refills:  0       prenatal  multivitamin  plus iron 27-0.8 MG Tabs per tablet        Dose:  1 tablet   Take 1 tablet by mouth daily   Refills:  0       TYLENOL PO        Dose:  325 mg   Take 325 mg by mouth every 8 hours as needed   Refills:  0       VITAMIN B 12 PO        Take by mouth daily   Refills:  0       VITAMIN C PO        Dose:  500 mg   Take 500 mg by mouth daily   Refills:  0            Where to get your medicines      These medications were sent to [place], [place] - [address] [facility]  [address] [facility] [address]     Phone:  [phone]    - ibuprofen 800 MG tablet  - senna-docusate 8.6-50 MG per tablet  - sertraline 50 MG tablet      Some of these will need a paper prescription and others can be bought over the counter. Ask your nurse if you have questions.     Bring a paper prescription for each of these medications    - LORazepam 0.5 MG tablet  - oxyCODONE 5 MG IR tablet             Protect others around you: Learn how to safely use, store and throw away your medicines at www.disposemymeds.org.             Medication List: This is a list of all your medications and when to take them. Check marks below indicate your daily home schedule. Keep this list as a reference.      Medications           Morning Afternoon Evening Bedtime As Needed    calcium carbonate 500 MG tablet   Commonly known as:  OS-SANTOSH 500 mg [place]   Take 500 mg by mouth 2 times daily                                ibuprofen 800 MG tablet   Commonly known as:  ADVIL/MOTRIN   Take 1 tablet (800 mg) by mouth every 8 hours as needed for other (cramping)   Last time this was given:  800 mg on 2/5/2017  7:05 AM                                LORazepam 0.5 MG tablet   Commonly known as:  ATIVAN   Take 1 tablet (0.5 mg) by mouth every 8 hours as needed for anxiety (avoid breastfeeding for 2-3 hours after dosing)   Last time this was given:  1 mg on 2/2/2017 10:53 PM                                oxyCODONE 5 MG IR  tablet   Commonly known as:  ROXICODONE   Take 1-2 tablets (5-10 mg) by mouth every 4 hours as needed for moderate to severe pain or pain   Last time this was given:  10 mg on 2/5/2017 11:20 AM                                prenatal multivitamin  plus iron 27-0.8 MG Tabs per tablet   Take 1 tablet by mouth daily                                senna-docusate 8.6-50 MG per tablet   Commonly known as:  SENOKOT-S;PERICOLACE   Take 1-2 tablets by mouth 2 times daily as needed for constipation   Last time this was given:  2 tablets on 2/5/2017  9:07 AM                                sertraline 50 MG tablet   Commonly known as:  ZOLOFT   Take 1 tablet (50 mg) by mouth daily                                TYLENOL PO   Take 325 mg by mouth every 8 hours as needed   Last time this was given:  650 mg on 2/5/2017 11:20 AM                                VITAMIN B 12 PO   Take by mouth daily                                VITAMIN C PO   Take 500 mg by mouth daily

## 2017-02-01 NOTE — ANESTHESIA POSTPROCEDURE EVALUATION
Patient: Rocky Lam     SECTION (N/A Abdomen)  Additional InformationProcedure(s):  COMBINED  SECTION, TUBAL LIGATION  - Wound Class: II-Clean Contaminated    Diagnosis:twins, elective  Diagnosis Additional Information: 36w6d gestation, mono-di twin pregnancy, gestational diabetes, preeclampsia, thrombocytopenia, obesity, multipartity declines tubal ligation, AMA, irritable bowel syndrome, OCD, anxiety, PTSD, history of THC use    Anesthesia Type:  Spinal    Note:  Anesthesia Post Evaluation    Patient location during evaluation: PACU  Patient participation: Able to fully participate in evaluation  Level of consciousness: awake  Pain management: adequate  Airway patency: patent  Cardiovascular status: acceptable  Respiratory status: acceptable  Hydration status: acceptable  PONV: controlled     Anesthetic complications: None    Comments: .Anticipate full return of neurologic function          Last vitals:  Filed Vitals:    17 0720   BP: 146/76   Temp: 97.7  F (36.5  C)   Resp: 20       Electronically Signed By: Surya Whiteside DO  2017  9:37 AM

## 2017-02-01 NOTE — ANESTHESIA PROCEDURE NOTES
Peripheral nerve/Neuraxial procedure note : intrathecal  Pre-Procedure  Performed by MARGUERITE WHITESIDE  Location:     Pre-Anesthestic Checklist: patient identified, IV checked, risks and benefits discussed, informed consent, monitors and equipment checked, pre-op evaluation and at physician/surgeon's request    Timeout  Correct Patient: Yes   Correct Procedure: Yes   Correct Site: Yes   Correct Laterality: N/A   Correct Position: Yes   Site Marked: N/A   .   Procedure Documentation  ASA 3  .    Procedure:    Intrathecal.   (midline approach)      Patient Prep;mask, povidone-iodine 7.5% surgical scrub.  .  Needle: (). . Spinal Needle: Sprotte 24 G. Introducer used. . .     Assessment/Narrative  Paresthesias: No.  .  .  clear CSF fluid removed . Comments:  .Bupivicaine 13.5mg + Fentanyl 20mcg + Morphine 0.4mg    R Whiteside

## 2017-02-01 NOTE — PROVIDER NOTIFICATION
02/01/17 0720   Provider Notification   Provider Name/Title Dr. Huerta   Method of Notification Phone   Dr. Huerta ordered [facility] labs

## 2017-02-01 NOTE — ANESTHESIA CARE TRANSFER NOTE
Patient: Rocky Lam     SECTION (N/A Abdomen)  Additional InformationProcedure(s):  COMBINED  SECTION, TUBAL LIGATION  - Wound Class: II-Clean Contaminated    Diagnosis: twins, elective  Diagnosis Additional Information: No value filed.    Anesthesia Type:   Spinal     Note:  Airway :Room Air  Patient transferred to:Labor and Delivery  Comments: VSS.      Vitals: (Last set prior to Anesthesia Care Transfer)              Electronically Signed By: TOOTIE Conrad CRNA  2017  9:29 AM

## 2017-02-01 NOTE — PLAN OF CARE
Patient here for scheduled primary  section for twin delivery.  36+6W gestation. Obtain history and physical, release signed and held  section orders.

## 2017-02-01 NOTE — INTERVAL H&P NOTE
This H&P has been reviewed and there are no clinically significant changes in the patient s condition,except that labs collected on day of H&P did demonstrate preeclampsia. Blood pressures mild range today. Thrombocytopenia noted.  The Patient is approved for surgery. She now declines tubal ligation.    We discussed and reviewed surgical risks including but not limited to bleeding, infection, damage to surrounding organs/structures, fetal injury, heart attack, stroke, blood clot, death. This procedure has been fully reviewed with the patient and written informed consent has been obtained. Patient verbalizes understanding and agrees to procedure.

## 2017-02-01 NOTE — LACTATION NOTE
"Lactation visit for latching assistance of twins. Twin A blood sugar 45 and was able to latch with mother in laid back position and  at her side. Good sucks and swallows noted, only slight stimulation needed to keep him nursing for 10 minutes. Twin B blood sugar 59 and attempted latching in same position on opposite breast as other . Lethargic, but did suck on finger effectively. When attempted to transfer to breast from finger, only licked and thrust tongue out. Small amount of clear mucous regurgitated and  continued to be having issues with belly. Colostrum expressed on nipple and  able to lick some colostrum off. Will re-check blood sugar after 1.5 hours and attempt another feeding and/or hand express and finger feed . Twin B does have some slight facial and nose distortion which mother states is from \"being squished in my belly.\" Did not seem to cause discomfort when sucking on finger.   "

## 2017-02-02 LAB
COPATH REPORT: NORMAL
GLUCOSE BLDC GLUCOMTR-MCNC: 106 MG/DL (ref 70–99)
HGB BLD-MCNC: 10.3 G/DL (ref 11.7–15.7)

## 2017-02-02 PROCEDURE — 25000132 ZZH RX MED GY IP 250 OP 250 PS 637: Performed by: OBSTETRICS & GYNECOLOGY

## 2017-02-02 PROCEDURE — 00000146 ZZHCL STATISTIC GLUCOSE BY METER IP

## 2017-02-02 PROCEDURE — 85018 HEMOGLOBIN: CPT | Performed by: OBSTETRICS & GYNECOLOGY

## 2017-02-02 PROCEDURE — 36415 COLL VENOUS BLD VENIPUNCTURE: CPT | Performed by: OBSTETRICS & GYNECOLOGY

## 2017-02-02 PROCEDURE — 90791 PSYCH DIAGNOSTIC EVALUATION: CPT | Performed by: PSYCHOLOGIST

## 2017-02-02 PROCEDURE — 12000029 ZZH R&B OB INTERMEDIATE

## 2017-02-02 RX ORDER — OXYCODONE HYDROCHLORIDE 5 MG/1
5 TABLET ORAL
Status: DISCONTINUED | OUTPATIENT
Start: 2017-02-02 | End: 2017-02-03

## 2017-02-02 RX ORDER — LORAZEPAM 0.5 MG/1
1 TABLET ORAL ONCE
Status: COMPLETED | OUTPATIENT
Start: 2017-02-02 | End: 2017-02-02

## 2017-02-02 RX ORDER — IBUPROFEN 400 MG/1
400-800 TABLET, FILM COATED ORAL EVERY 6 HOURS PRN
Status: DISCONTINUED | OUTPATIENT
Start: 2017-02-02 | End: 2017-02-05 | Stop reason: HOSPADM

## 2017-02-02 RX ADMIN — ACETAMINOPHEN 975 MG: 325 TABLET, FILM COATED ORAL at 17:27

## 2017-02-02 RX ADMIN — LORAZEPAM 1 MG: 0.5 TABLET ORAL at 22:53

## 2017-02-02 RX ADMIN — SENNOSIDES AND DOCUSATE SODIUM 2 TABLET: 8.6; 5 TABLET ORAL at 09:00

## 2017-02-02 RX ADMIN — ACETAMINOPHEN 975 MG: 325 TABLET, FILM COATED ORAL at 00:17

## 2017-02-02 RX ADMIN — OXYCODONE HYDROCHLORIDE 10 MG: 5 TABLET ORAL at 07:04

## 2017-02-02 RX ADMIN — OXYCODONE HYDROCHLORIDE 5 MG: 5 TABLET ORAL at 10:13

## 2017-02-02 RX ADMIN — OXYCODONE HYDROCHLORIDE 5 MG: 5 TABLET ORAL at 17:27

## 2017-02-02 RX ADMIN — OXYCODONE HYDROCHLORIDE 10 MG: 5 TABLET ORAL at 00:17

## 2017-02-02 RX ADMIN — IBUPROFEN 800 MG: 400 TABLET ORAL at 23:57

## 2017-02-02 RX ADMIN — OXYCODONE HYDROCHLORIDE 5 MG: 5 TABLET ORAL at 13:06

## 2017-02-02 RX ADMIN — OXYCODONE HYDROCHLORIDE 5 MG: 5 TABLET ORAL at 22:14

## 2017-02-02 RX ADMIN — IBUPROFEN 800 MG: 400 TABLET ORAL at 17:27

## 2017-02-02 RX ADMIN — OXYCODONE HYDROCHLORIDE 5 MG: 5 TABLET ORAL at 15:29

## 2017-02-02 RX ADMIN — OXYCODONE HYDROCHLORIDE 10 MG: 5 TABLET ORAL at 04:19

## 2017-02-02 RX ADMIN — ACETAMINOPHEN 975 MG: 325 TABLET, FILM COATED ORAL at 09:00

## 2017-02-02 RX ADMIN — OXYCODONE HYDROCHLORIDE 5 MG: 5 TABLET ORAL at 19:48

## 2017-02-02 RX ADMIN — IBUPROFEN 800 MG: 400 TABLET ORAL at 10:13

## 2017-02-02 RX ADMIN — IBUPROFEN 800 MG: 400 TABLET ORAL at 04:28

## 2017-02-02 NOTE — PLAN OF CARE
Problem: Goal Outcome Summary  Goal: Goal Outcome Summary  Outcome: No Change      Pt is dangled at bedside at 2100. Oxycodone given prior  getting up.  Moving slowly and is not tolerating the pain when moving. Quick jessi care done in the bathroom and  assisted back to bed , pt in tears  because of pain. Ketorolac given 15mg IV  and another dose of oxycodone given 5 mg for pain rate of 10.   Pt passing flatus and tolerating regular diet . No nausea and vomiting . Mom doing skin to skin . FOB here, supportive.

## 2017-02-02 NOTE — PLAN OF CARE
Problem: Goal Outcome Summary  Goal: Goal Outcome Summary  Outcome: No Change  Patient continues to be anxious today. Her concerns were discussed with Dr. Florentino and myself today and a psychiatric consult was placed by MD, see Dr. Florentino's note. Pain being managed with ibuprofen, tylenol, and oxycodone when available. Incision healing well with scant amount of moist drainage, dressing removed this AM by MD. Using motherlove for comfort. Patient has voided x1 since whitney removal. IV removed due to catheter damage. Up and to the bathroom with assist of two, tolerated okay. Encouraged patient to send infant's to NBN so she could get some rest and she did. Pumped x1 this shift due to infant's low blood sugars and jaundice, 2 mL of EBM fed to infants.

## 2017-02-02 NOTE — PLAN OF CARE
"Problem: Goal Outcome Summary  Goal: Goal Outcome Summary  Outcome: Improving  Pt able to get some rest between cares w/ twins rooming-in for part of night.  States ordered pain medications are \"keeping me comfortable\".  Ice to R side of incision, as well.  IV SL'ed; whitney draining moderate amt clear, yellow urine.  Passing flatus; BS X 4 quads are active and audible.  Dressing covering incision is dry and intact w/ dried bloody drainage.  FOB rooming-in.  Pt appears somewhat anxious, but becomes less so after rest and re-assurance.  Plan to allow to rest and then assist up to BR later this AM, and d/c whitney catheter.        "

## 2017-02-02 NOTE — PROGRESS NOTES
"[facility] Postpartum Note    S:  Patient very anxious today, complaining of intrusive thoughts (worrying about son at home, partner falling on ice, babies choking) and feeling out of control. Has used anxiolytic medications in the past, feels like she needs \"nerve pills\" because she is very irritable and gets angry easily. Is not worried about feeling angry at the babies, but wants control. She states this is how her postpartum depression/anxiety started last time. Doesn't know how to deal with these thoughts.     Nausea controlled, tolerating regular diet. Ambulating, whitney out this AM. Hasn't voided yet. Passing flatus. Minimal lochia. Breastfeeding well. Pain not controlled well, which she feels makes her anxiety and irritability worse. Also not sleeping well.     O:  Vitals were reviewed  Temp: 97.7  F (36.5  C) Temp  Min: 97.5  F (36.4  C)  Max: 98.4  F (36.9  C)  Heart Rate: 74 Heart Rate  Min: 73  Max: 83  BP: 110/53 mmHg Systolic (24hrs), Av mmHg, Min:104 mmHg, Max:126 mmHg  Diastolic (24hrs), Av mmHg, Min:53 mmHg, Max:74 mmHg      Intake/Output Summary (Last 24 hours) at 17 09  Last data filed at 17 0613   Gross per 24 hour   Intake 1837.4 ml   Output   1400 ml   Net  437.4 ml           Gen - NAD, resting        CV - RRR        Abdomen - Obese, soft, fundus firm, below umbilicus, nontender        Incision - C/D/I, bandage removed with moisture along incision, no acute bleeding        Extremities - No calf tenderness, no unilateral edema    HEMOGLOBIN   Date Value Ref Range Status   2017 10.3* 11.7 - 15.7 g/dL Final   ]  ABO   Date Value Ref Range Status   2017 O  Final     RH(D)   Date Value Ref Range Status   2017  Pos  Final     No components found for: RUBELLA    A:   Postoperative Day #1 , s/p scheduled CS at 36.6 weeks gestation for mono-do twins, pre-eclampsia and GDMA1 with poor social situation and history of mood disorder.     P:  1)  Pre-eclampsia: " BPs stable, no signs of worsening disease. No antihypertensives indicated right now.        2)  GDM: BS stable, no additional checks needed. Emphasized 6-week GTT.         3)  Anxiety: I am very concerned about her worsening anxiety and intrusive thoughts as well as unpredictable irritability/anger, as are the nursing staff. They are concerned about her with     the babies and her ability to cope. I offered her Zoloft today and she refuses. I am not comfortable providing any anxiolytic/addictive medications in this setting. The nurses and I strongly feel that a psychiatry consultation is warranted to confirm safety of the infants and patient. I will order this. Social work is involved.       Sabra Florentino MD

## 2017-02-02 NOTE — CONSULTS
"Psychological consult, covering for psychiatry, ordered by Dr. Florentino.   This was an initial consult, which took 55 minutes.  This included 10 minutes to review records, 25 minutes interviewing the patient, and the rest of the time was providing report to RN and documenting.     Reason for consult  Rocky Lam was admitted on a 2-1-17 due to childbirth.  Staff have observed her to be \"highly anxious\" at times.  She reports that she has had intrusive worries about the safety of her family at home.  She also reports that she had similar symptoms after the birth of her first child.  She has not reported thoughts of harming her twins.  I was asked to help with mental health differential diagnosis and treatment planning    Records reviewed   H&P done in the [employer] system , including review of systems.  Current information in EPIC: Rocky has been documented to be requesting in bed, generally cooperative, but anxious and irritable at times.   Previous treatment records: I reviewed records available in Epic and did not locate any concerns about mental health symptoms, other than reference to \"mild anxiety\" in one ED document from 2014.    Behavioral Assessment   Behavior on the unit: Resting quietly, generally cooperative but anxious and irritable at times.    Behavioral observations: Rocky was visiting with several relatives and children when I arrived.  She greeted me in a friendly manner, and readily agreed to be interviewed.  She declined to have the others leave, and seemed open and collaborative during the interview.  The others respectfully focused on the twins.  We are also joined by Rocky's  and first child.  The group seemed supportive and warm towards Rocky, and the atmosphere during their visit was friendly and playful.   Rocky interacted with me in a comfortably, and several times during the interview joked in an engaging manner.    Rocky was fully oriented and alert, with normal attention and " "concentration maintained throughout the interview.  Speech was clear, fluent and non-pressured. No short or long term memory disturbance.  Thought processes were goal-directed and associations were tight, with no peculiar or delusional statements. Intelligence appears to be in the Average range. Insight and judgement appears to be at a well-established baseline, and intact.. Fund of knowledge is normal for stage of life.     There are no indications of depression symptoms.  She reports that she does not view herself as depressed, but readily acknowledged that she has chronic problems with anxiety, and experiences frequent intense, but brief, episodes of anxiety and even panic symptoms.  She also reports classic OCD symptoms, such as being over concerned about cleanliness and order in the home.  She reports that she is quite irritable at these times.  Affect was congruent with topic being discussed, with no indications of distress or pain.     Thought content was negative for suicidal ideation, death wish, hopelessness, thoughts of harming others, and auditory and visual hallucinations. Gait was not observed, but no unusual movements were observed.      Suicidal risk   History: She denies history of suicidal behavior.   Current: She denied, with convincing affect and solid eye contact, current suicidal risk, and spontaneously said, \"I have lots to live for, and lots to be happy for!\"     Medical and behavioral history    Medical:  Per medical team.   Sleep:  She reports a long-standing pattern of needing less sleep than usual, and at times gets by on \"one or two \"hours per night.  I brought up the question of a possible bipolar pattern, and she reports that she has been assessed for this but also reportedly was not found to have a bipolar disorder.  She reports that she has no history of depression episodes.   Energy:  Low recently, which she attributes in part to being pregnant with twins but also she worries that " "it seems lower than expected.  Appetite:  Not a concern.   Family: Close and warm relationships were evident during the family visit.   Mental health treatment    Psychiatric services: She has a history of being prescribed antidepressant medication, which she did not like because \"it made me feel numb.\"  She also has a history of prescriptions for anxiolytic medications, such as Xanax and Ativan, and reports that she found them to be helpful.  She described taking them as needed.  She emphasized that she prefers to avoid taking medication and to use \"natural approaches\" as much as possible, but also readily agreed that her current situation would benefit from consultation with Dr. Fox about possible use of medication to manage her current anxiety.  She is concerned, however, about breast-feeding but agreed to discuss this with Dr. Fox.    Psychological/counseling services: She recently began receiving in-home services provided by the [facility].  She has only had an intake appointment, but reports that she felt that it was a good start and she plans to follow-up with this program.   Chemical health treatment: She has no history of problems with substance abuse, and has not required CD treatment.   Social Support services: None.      Impressions  Strengths:  Rocky interacted with me comfortably and collaboratively, and showed insight into the problems that she has with anxiety and irritability, and readily agreed to speak with Dr. Fox tomorrow about possible p.r.n. medication.  She has an existing relationship with a community [facility] program and plans to follow-up.  She understands that this program can help her, if necessary, access a psychiatrist within the clinics system.  Liabilities:  Information that I obtained may be limited due to the presence of her family.  Individual follow-up by Dr. Fox will help confirm information that she provided today.    Diagnoses    Anxiety disorder, " unspecified, with panic episodes; rule out possible atypical bipolar disorder.    Recommendations  1.  I conferred with her assigned RN and asked that Dr. Florentino the informed about the outcome of this consult  2.  If Dr. Florentino is in agreement, Dr. Fox can follow up tomorrow to assess anxiolytic medication options.  A follow-up psychiatric consult will need to be ordered.    Baldomero Reyez Psy.D., L.P.  [phone]

## 2017-02-02 NOTE — LACTATION NOTE
Lactation follow up for latching assistance with twins. Blood sugar for Twin 2 was 44 and he was actively showing signs of wanting to eat. Latched within a few attempts on left side with mother continuing in laid back position and  on her side. Sucks initially were shallow and weak, but writer provided breast compression for first several minutes and sucks increased in intensity and became deeper. He nursed well for 20-35 minutes. Twin 1 blood sugar was 40 and he was sleepy on mother. Writer hand expressed a couple of drops of colostrum and  immediately latched with coordinated sucking and swallowing noted for 15-20 minutes. Left STS with mother and encouraged her to feed every 2 hours for blood sugar stability.

## 2017-02-03 PROCEDURE — 25000132 ZZH RX MED GY IP 250 OP 250 PS 637: Performed by: OBSTETRICS & GYNECOLOGY

## 2017-02-03 PROCEDURE — 99207 ZZC NON-BILLABLE SERV PER CHARTING: CPT | Performed by: PSYCHIATRY & NEUROLOGY

## 2017-02-03 PROCEDURE — 40000084 ZZH STATISTIC IP LACTATION SERVICES 16-30 MIN

## 2017-02-03 PROCEDURE — 12000029 ZZH R&B OB INTERMEDIATE

## 2017-02-03 RX ORDER — LORAZEPAM 0.5 MG/1
0.5 TABLET ORAL 3 TIMES DAILY PRN
Status: DISCONTINUED | OUTPATIENT
Start: 2017-02-03 | End: 2017-02-05 | Stop reason: HOSPADM

## 2017-02-03 RX ORDER — OXYCODONE HYDROCHLORIDE 5 MG/1
5-10 TABLET ORAL
Status: DISCONTINUED | OUTPATIENT
Start: 2017-02-03 | End: 2017-02-05 | Stop reason: HOSPADM

## 2017-02-03 RX ADMIN — OXYCODONE HYDROCHLORIDE 10 MG: 5 TABLET ORAL at 17:39

## 2017-02-03 RX ADMIN — ACETAMINOPHEN 975 MG: 325 TABLET, FILM COATED ORAL at 17:38

## 2017-02-03 RX ADMIN — ACETAMINOPHEN 975 MG: 325 TABLET, FILM COATED ORAL at 09:36

## 2017-02-03 RX ADMIN — SENNOSIDES AND DOCUSATE SODIUM 2 TABLET: 8.6; 5 TABLET ORAL at 09:36

## 2017-02-03 RX ADMIN — IBUPROFEN 800 MG: 400 TABLET ORAL at 05:45

## 2017-02-03 RX ADMIN — SENNOSIDES AND DOCUSATE SODIUM 2 TABLET: 8.6; 5 TABLET ORAL at 20:04

## 2017-02-03 RX ADMIN — OXYCODONE HYDROCHLORIDE 5 MG: 5 TABLET ORAL at 07:41

## 2017-02-03 RX ADMIN — OXYCODONE HYDROCHLORIDE 5 MG: 5 TABLET ORAL at 15:55

## 2017-02-03 RX ADMIN — OXYCODONE HYDROCHLORIDE 5 MG: 5 TABLET ORAL at 01:29

## 2017-02-03 RX ADMIN — OXYCODONE HYDROCHLORIDE 5 MG: 5 TABLET ORAL at 11:34

## 2017-02-03 RX ADMIN — OXYCODONE HYDROCHLORIDE 5 MG: 5 TABLET ORAL at 03:35

## 2017-02-03 RX ADMIN — IBUPROFEN 800 MG: 400 TABLET ORAL at 17:39

## 2017-02-03 RX ADMIN — IBUPROFEN 800 MG: 400 TABLET ORAL at 23:53

## 2017-02-03 RX ADMIN — OXYCODONE HYDROCHLORIDE 5 MG: 5 TABLET ORAL at 20:03

## 2017-02-03 RX ADMIN — OXYCODONE HYDROCHLORIDE 5 MG: 5 TABLET ORAL at 05:45

## 2017-02-03 RX ADMIN — OXYCODONE HYDROCHLORIDE 10 MG: 5 TABLET ORAL at 09:36

## 2017-02-03 RX ADMIN — OXYCODONE HYDROCHLORIDE 10 MG: 5 TABLET ORAL at 22:08

## 2017-02-03 RX ADMIN — IBUPROFEN 800 MG: 400 TABLET ORAL at 11:34

## 2017-02-03 RX ADMIN — ACETAMINOPHEN 975 MG: 325 TABLET, FILM COATED ORAL at 01:29

## 2017-02-03 RX ADMIN — OXYCODONE HYDROCHLORIDE 10 MG: 5 TABLET ORAL at 13:40

## 2017-02-03 NOTE — CONSULTS
"Patient refusing to be seen this am due to \"I'm in an emotional state.\"  I did identify myself but was still rebuffed.  She was talking on the phone and eating at same time.  Twins in room sleeping.  Per nursing she was irritable with her 8year old son and wanted him to leave.  Notes reviewed.  She has had multiple prescriptions of ativan and xanax in the past.  Was given dose of ativan last georgina.  I have ordered low dose ativan and seroquel prn while she is here for the next few days and is supplementing with bottle already.  Psychology can followup with her tomorrow.  Please call for med questions until release.    "

## 2017-02-03 NOTE — PLAN OF CARE
Problem: Goal Outcome Summary  Goal: Goal Outcome Summary  Outcome: Improving  Data: Vital signs within normal limits. Postpartum checks within normal limits - see flow record. Patient eating and drinking normally. Patient able to empty bladder independently and is up ambulating. No apparent signs of infection. Incision healing well. Patient significantly preoccupied with anxiety and requires prompting to perform self cares, requires assistance caring for infants.  Action: Patient medicated during the shift for pain. See MAR. Plan of care to give pain meds as they are available to get pain under control. T-pump and ice pack for comfort.  Response: Positive attachment behaviors observed with infant. Support persons Chente and Pierce present.   Plan: Psych consult tomorrow to discuss longterm plan for managing anxiety. Anticipate discharge on 2/4/17.
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
## 2017-02-03 NOTE — PROGRESS NOTES
"Patient very agitated with 8 year old son because he is not responding to her questions or listening. Patient woke up  to take 8 year old out of the room because \"I can't deal with him right now\". While  and son were out of room psychiatrist was here to speak with the patient to discuss medication options and the patient declined talking to her. Writer talked with psychiatrist who stated she will make some medication recommendations for the patient and have the psychologist follow up with her.  "

## 2017-02-03 NOTE — PLAN OF CARE
"When took over cares for patient and her  twin boys she was breast feeding both babies.FOB and son in the room and they were going to help supplement babies by bottle when done breast feeding. Patient requested to get up to use the bathroom. She was able to get up and out of bed, however, C/O a lot of ain on the right side of her incision. Reports she feels she must have pulled something when she fell out of bed today. Asked patient how she fell out of bed and she reported she attempted to roll over per self and did not realize she was so close to edge and fell. Incision is clean, dry and intact. Talked with patient about feeling more pain now that medication has worn off and she is up and doing more. Reports her self cares are not high on her list right now. FOB feed babies formula per bottle and did dress them in sleep sacks then put head phones in and left room for 10-15 minutes. When FOB came back into room, headphones were still in place. Patient attempted to talk to him and he did take them out to listen to her. This writer then left room. At 0120 returned to the room to give Tylenol and Motrin. Patient was in the bathroom. She was telling   FOB that she had taken that medication or anxiety and\" it did not help for long.\" He stated, \"Maybe you should tell the nurse about that, she is right here.\" He then proceeded to put headphones back in and laid on the couch. Patient started to cry and stated \"something has to be wrong and she just didn't know how she could do anything with all this pain.\" Allowed patient to express concerns.Other concerns included not having much sleep for past several days and FOB putting headphones in so he cannot hear anything. When asked patient who she had to help her with the babies when she gets home, she reports FOB and 8 year old son are all she has for now. Reports hat there is a lady at [employer] that would help if she asked but she has mental illness and feels she would " "cause more stress then she can handle for now. Also when talking with patient she  stated \"I cannot get up very fast if something happens with the babies and he cannot hear anything to know that me and/or the babies need something. Some times he is so helpful and other times I do not think he even knows I am here and need help.\" Concerns were expressed about how she was going to be able to do all she needs to do when she gets home with not much support.\"  Encouraged patient to try to get sleep and not worry about things right now. This writer told patient that she could sit in room to monitor babies while she tried to sleep. [place] reassured with that plan. This writer sat in room for approximatelly 45 minutes and patient did fall asleep within 5  Minutes. Patient and babies are currently sleeping.  "

## 2017-02-03 NOTE — PLAN OF CARE
Problem: Goal Outcome Summary  Goal: Goal Outcome Summary  Outcome: No Change  Was able to sleep for 3 hours with being woken up for pain medications when due. Motrin, Tylenol and oxycodone for discomfort with some improvement. Breasts are sore, tender with some small bruises. Has been using Mother's Love cream. Talked with her about hydrogel pads when up and able to put a bra on.  Monitor.

## 2017-02-03 NOTE — PLAN OF CARE
Problem: Goal Outcome Summary  Goal: Goal Outcome Summary  Outcome: Improving  Since agitation this AM (see previous note) patient has been much more calm and in control than she has been the past two days. Patient was informed of the seroquel and ativan orders that were placed, has not needed or requested either. Medicated with oxycodone, ibuprofen, and tylenol this shift for pain. Incision healing well with no drainage. Using a t-pump for comfort. Able to empty bladder without difficulties. Up in room independently, encouraged to ambulate. Both breast and formula feeding infants. Mostly formula fed newborns today. Patient was encouraged to pump each time she gives a bottle, especially if she does not breastfeed first, but she did not pump at all this shift. Patient has to be encouraged to feed babies every 2-3 hours. Has been talking about showering since this AM but has little self drive to do so.

## 2017-02-03 NOTE — PROGRESS NOTES
"[facility] Postop C/S Note    S:  Patient states pain is not well controlled on current pan mediation regimen.  She is taking Oxycodone 1 tablet every 2 hours as 2 tablets every 3 hours was not managing pain either.  Taking Motrin every 6 hours.  Minimal lochia.  Flatus passed, tolerating Regular diet well.  C/o's of back pain and pain on the right side of her incision.  She is worried she may have \"popped\" a stitch when getting out of bed to go to the bathroom.  Anxiety is a little better this am but she still feels overwhelmed.  She denies any HA's, visual changes, RUQ pain or N/V.    O:  Blood pressure 142/67, pulse 108, temperature 97.5  F (36.4  C), temperature source Oral, resp. rate 16, height 1.727 m (5' 8\"), weight 124.739 kg (275 lb), SpO2 98 %, unknown if currently breastfeeding.          Intake/Output Summary (Last 24 hours) at 02/03/17 0809  Last data filed at 02/02/17 1430   Gross per 24 hour   Intake      0 ml   Output    650 ml   Net   -650 ml           Abdomen - Non-distended, Normoactive bowel sounds, soft, appropriately tender; Fundus firm, at umbilicus, nontender        Incision - clean, dry, intact        Extremities - No calf tenderness, moderate LE edema    HEMOGLOBIN   Date Value Ref Range Status   02/02/2017 10.3* 11.7 - 15.7 g/dL Final   02/01/2017 12.1 11.7 - 15.7 g/dL Final       A:   Postop Day# 2, s/p Primary LTCS twins, pre-eclampsia, A1GDM, anxiety, hx of mood disorder and poor social situation     P:  1)  Routine care, will try 2 Oxycodone alternated with 1 Oxycodone every 2 hours with scheduled Motrin every 6 hours for today.  Heating pad for back and abdominal binder for abdominal support with ambulating.        2)  BP's stable, no sx's of pre-eclampsia.        3)  BS stable PP and pt will need 2 hour GTT scheduled at PP visit.        4)  Plan for Psychiatry to see pt today and make any medication recommendations, SW following.        5)  Breast and formula feeding.       "  6)  Anticipate discharge tomorrow or POD #4.    Saniya White, DO

## 2017-02-03 NOTE — PROVIDER NOTIFICATION
"   02/02/17 2030   Coping/Psychosocial   Observed Emotional State agitated;anxious;overwhelmed;panic;tearful/crying   Verbalized Emotional State anxiety   Plan Of Care Reviewed With patient;significant other   Coping Strategies   Family/Support System Care caregiver stress acknowledged;family care conference arranged;involvement promoted;presence promoted;self-care encouraged;support provided   Upon entering room observed patient having a panic attack. Patient able to be redirected with controlled breathing exercise. Therapeutic conversation with patient regarding many sources of anxiety and frustration including issues with visiting family, feeling overwhelmed by hospital staff and medical information, messy state of the room, issues at home that she cannot attend to while in hospital. Patient states she feels better after being able to \"let some of that out\".    Discussed plan of care with patient and significant other. Hospital staff to check in with primary RN before entering room. Patient would like a PRN anxiety medication that is safe for breastfeeding. MD paged, awaiting return call.  "

## 2017-02-03 NOTE — PROVIDER NOTIFICATION
02/02/17 5813   Provider Notification   Provider Name/Title Dr. Florentino   Method of Notification Phone   Request Evaluate-Remote   Notification Reason Medication Request   Requested PRN anxiety medication. One time dose of ativan approved, further anxiety medication to be discussed during psych consult tomorrow.

## 2017-02-04 PROCEDURE — 25000132 ZZH RX MED GY IP 250 OP 250 PS 637: Performed by: OBSTETRICS & GYNECOLOGY

## 2017-02-04 PROCEDURE — 99207 ZZC NON-BILLABLE SERV PER CHARTING: CPT | Performed by: PSYCHOLOGIST

## 2017-02-04 PROCEDURE — 12000027 ZZH R&B OB

## 2017-02-04 RX ADMIN — OXYCODONE HYDROCHLORIDE 5 MG: 5 TABLET ORAL at 04:01

## 2017-02-04 RX ADMIN — ACETAMINOPHEN 975 MG: 325 TABLET, FILM COATED ORAL at 01:06

## 2017-02-04 RX ADMIN — SENNOSIDES AND DOCUSATE SODIUM 2 TABLET: 8.6; 5 TABLET ORAL at 07:55

## 2017-02-04 RX ADMIN — OXYCODONE HYDROCHLORIDE 10 MG: 5 TABLET ORAL at 02:02

## 2017-02-04 RX ADMIN — OXYCODONE HYDROCHLORIDE 10 MG: 5 TABLET ORAL at 06:01

## 2017-02-04 RX ADMIN — ACETAMINOPHEN 650 MG: 325 TABLET, FILM COATED ORAL at 19:12

## 2017-02-04 RX ADMIN — OXYCODONE HYDROCHLORIDE 10 MG: 5 TABLET ORAL at 17:02

## 2017-02-04 RX ADMIN — ACETAMINOPHEN 650 MG: 325 TABLET, FILM COATED ORAL at 23:12

## 2017-02-04 RX ADMIN — ACETAMINOPHEN 975 MG: 325 TABLET, FILM COATED ORAL at 11:05

## 2017-02-04 RX ADMIN — OXYCODONE HYDROCHLORIDE 10 MG: 5 TABLET ORAL at 21:50

## 2017-02-04 RX ADMIN — SENNOSIDES AND DOCUSATE SODIUM 2 TABLET: 8.6; 5 TABLET ORAL at 19:13

## 2017-02-04 RX ADMIN — IBUPROFEN 800 MG: 400 TABLET ORAL at 06:01

## 2017-02-04 RX ADMIN — OXYCODONE HYDROCHLORIDE 5 MG: 5 TABLET ORAL at 13:51

## 2017-02-04 RX ADMIN — IBUPROFEN 800 MG: 400 TABLET ORAL at 12:04

## 2017-02-04 RX ADMIN — OXYCODONE HYDROCHLORIDE 5 MG: 5 TABLET ORAL at 07:55

## 2017-02-04 RX ADMIN — OXYCODONE HYDROCHLORIDE 5 MG: 5 TABLET ORAL at 00:23

## 2017-02-04 RX ADMIN — OXYCODONE HYDROCHLORIDE 5 MG: 5 TABLET ORAL at 19:12

## 2017-02-04 RX ADMIN — ACETAMINOPHEN 650 MG: 325 TABLET, FILM COATED ORAL at 15:21

## 2017-02-04 RX ADMIN — OXYCODONE HYDROCHLORIDE 10 MG: 5 TABLET ORAL at 11:05

## 2017-02-04 RX ADMIN — IBUPROFEN 800 MG: 400 TABLET ORAL at 18:12

## 2017-02-04 NOTE — PLAN OF CARE
"Problem: Goal Outcome Summary  Goal: Goal Outcome Summary  Outcome: No Change  Patient very agitated at start of shift and asked for nurses to come to her room immediately. Clinton wanted to leave with Pierce and patient wanted them to stay. Ambrosio was asking to go as well as he was tired. Nurses were asked to leave the room to given them time to talk. Clinton and Pierce did go home for night. Patient seemed ok with plan. Patient called Ambrosio to come back and bring her soup that he had picked up earlier in the evening. She was upset she had not eaten dinner yet and it was midnight. Ambrosio brought her soup. Patient want him to heat it up, nurse assured them both she would handle it and Ambrosio could take Pierce home. Patient required significant reminders for self cares and when to feed infants. Patient shared she has 3 flights of stairs at home and would not be able to leave hospital as she could not do stairs. Patient said she would walk halls in morning to try to get ready for stairs, but she probably couldn't go home. Patient also stated she was moving in a week and only had one flight of stairs at new home. At midnight, nurse asked when infants ate last, patient stated \"I'm not sure, but they are probably over due.\" Nurse offered to help get infants latched on as it had been 4 hours since infants had eaten. Mother stated she really needed to eat since it was midnight and she hadn't had dinner. Patient did not finish dinner as infants were fussing and she said she couldn't eat if they were crying. Nurse encouraged patient to get infants latch on her own as she would be discharging soon and needed to be independent with infants. Patient stated \"my hands are numb, so I can't feel anything right now.\" Patient told nurse she was unable to latch the infants on her own. Nurse assisted with latching. Nurse offered to help formula feed infants after breastfeeding and patient declined help as she wanted to feed them " "both. Nurse got bottles ready and patient tandem fed infants. Nurse requested patient to call after feedings so the nurse could swaddle infants and get them back to sleep. Patient did not call, nurse went in after 20 minutes and mother was sleeping with both infants and needed a gentle touch to wake up. Patient educated about co-sleeping policy. Nurse asked patient about getting infant's carseat test started and getting one out of the way, we could revisit later in the night to see if she was comfortable having the other carseat test completed. Patient stated she was very tired and needed to finish feeding the infants formula. Twin 1 had not drank any formula, Twin 2 had drank 10 mL. Nurse offered to have the nursery finish feeds so she could rest. Nurse asked what she thought of nursery feeding infants and starting the first carseat test. Mother said that would be really helpful so she could sleep a little and agreed to have first carseat test done and to have both infants in the nursery until the next feeding. Nurse and patient would talk at 0400 when pain medications were due and infants were due to eat again. At 0200, both infants were brought to nursery per patient request. At 0400, pain medications given as scheduled and infants brought back to breastfeed. Patient woke and asked if she was able to breastfeed. Patient said she was too tired and asked if nurse could feed them formula this feeding. Nurse told patient the nursery could feed the infants if she wanted. Patient agreed that was best because she was too tired. Nurse asked if second carseat test could be started and mother said \"Yes\". Nurse told patient she would be in at 0600 with the next scheduled pain medications and to call if she needed anything before then. At 0600, pain medications brought and NNP was with to explain why twin 2 was being transferred to NICU. (See note in twin 2 chart) Patient crying and upset. Support provided. Patient called " "FOB to come right away. Patient requested help to the bathroom to get cleaned up and dressed so she could go to NICU. 8 year-old son Pierce has not had a flu shot and mother was informed he was not allowed to be in the NICU for that reason. Nurse also let mother know Pierce was not allowed to be left in the room alone, either she or FOB would need to stay with him. Patient stated understanding. At 0700 FOB came to nurses desk and demanded someone take patient to NICU and that they wanted twin 1 in the room. Writer explained NNP requested he stay in the nursery for monitoring until peds rounded. FOB stated \"you said he could come back at 0700.\" Writer explained patient could go to NICU at 0700, but twin 1 needed to be seen by peds. FOB wanted to know what time peds would be here. Writer explained it would be this morning, but did not have an exact time. FOB very upset and went back to room. At change of shift, LPN went to assist patient to NICU. Patient was cleaning room, taking sheets off bed, requesting new linens, requesting floor to be cleaned by housekeeping because there was a sticky spot on the floor. Per LPN, she stated she wanted the room degermified before the twins came back.        "

## 2017-02-04 NOTE — PLAN OF CARE
Problem: Goal Outcome Summary  Goal: Goal Outcome Summary  Outcome: Improving  Data: Vital signs within normal limits. Postpartum checks within normal limits - see flow record. Patient eating and drinking normally. Patient able to empty bladder independently and is up ambulating. No apparent signs of infection. Incision healing well. Patient requires prompting to perform self-cares and requires significant assistance to care for infants.  Action: Patient medicated during the shift for pain. See MAR. Patient reassessed within 1 hour after each medication and pain was improved - patient stated she was comfortable.  Response: Positive attachment behaviors observed with infant. Support persons Adarsh and Pierce present.   Plan: Anticipate discharge on 2/4/17.

## 2017-02-04 NOTE — PROGRESS NOTES
Postpartum Progress Note:       DAILY NOTE - POSTOPERATIVE DAY #3 Primary C/S, twins, GDM, preeclampsia, anxiety    SUBJECTIVE:     Pain controlled? Yes  Tolerating a regular diet? YES  Ambulating? YES  Voiding without difficulty? Yes  Lochia? minimal  Breastfeeding:  yes    OBJECTIVE:  Filed Vitals:    02/02/17 2357 02/03/17 0833 02/03/17 1555 02/04/17 0202   BP: 142/67 122/68 129/68 118/70   Pulse: 108  95 88   Temp: 97.5  F (36.4  C) 97.9  F (36.6  C) 97.5  F (36.4  C) 98  F (36.7  C)   TempSrc: Oral Oral Oral Oral   Resp: 16 18 18 18   Height:       Weight:       SpO2:           Constitutional: healthy, alert and no distress    Abdomen:  Uterine fundus is firm, non-tender and at the level of the umbilicus      Incision: clean, dry and intact    Extremeties:  tr edema, non-tender    LABS:    HEMOGLOBIN   Date Value Ref Range Status   02/02/2017 10.3* 11.7 - 15.7 g/dL Final   02/01/2017 12.1 11.7 - 15.7 g/dL Final     [id number]   immune   8/3/2016   ABO        O   1/31/2017      RH      Pos   1/31/2017     ASSESSMENT:  Post-operative day #3  Primary C/S  Very anxious about going home, having difficulty with feeding and pain control, one infant in NICU     PLAN:   Work on pain control  Lactation to see patient  Anxiolytics ordered by Psychiatry, Social Work to follow-up with patient  Ambulation encouraged  Monitor wound for signs of infection  Pain control measures as needed  Anticipate discharge tomorrow    Esther Ordoñez

## 2017-02-04 NOTE — PLAN OF CARE
Problem: Goal Outcome Summary  Goal: Goal Outcome Summary  Outcome: Improving  Early this AM patient came storming down the hallway pushing wheelchair stating she needed a ride down to NICU. When patient saw writer she smiled and seemed relieved. Writer, patient, and pediatrician then went back to room to give patient her medications and discuss [place] before going down to NICU to see John. Patient very anxious about going down to NICU stating she had not yet been down there. When patient came back from NICU she was very distressed and anxious stating she did not want anyone in her room besides writer and family. Dr. Ordoñez soon entered and patient became tearful stating she is not ready to go home. Both Dr. Ordoñez and charmaine remained in room and talked to patient and she calmed down. Patient a bit anxious when BUBBA and her son Pierce were here, but is much calmer and able to rest when they are gone.   A plan was made with Dr. Huang, patient, and write this AM about feeding  every 2-3 hours. Patient has not adhered to this plan and needs to be prompted to feed her baby. Patient's milk came in today and she appears to be excited about this. She requested the pump she will be receiving at discharge so she can look at it and become familiar with it. The pump was given and the paperwork is signed and in her chart. She was encouraged to continue to use the pump the hospital supplies while here.    Medicated with oxycodone, ibuprofen, and tylenol this shift for pain. Incision healing well with no drainage. Using motherlove and hydrogel pads for comfort. Able to empty bladder independently. Up and ambulating in room.           pump

## 2017-02-04 NOTE — PLAN OF CARE
Problem: Postpartum ( Delivery) (Adult)  Goal: Signs and Symptoms of Listed Potential Problems Will be Absent or Manageable (Postpartum)  Signs and symptoms of listed potential problems will be absent or manageable by discharge/transition of care (reference Postpartum ( Delivery) (Adult) CPG).   Outcome: No Change  Anxious, agitated, tearful

## 2017-02-04 NOTE — CONSULTS
"Very difficult case to manage today, as mom continues to deny beh health assessment and intervention.    I arrived on &B in order to assess the patient.  Reviewed records and spoke with nursing staff.  Pt had been in the NICU for several hours and was scheduled to return to &B.  I accompanied the nurse to NICU and spoke with staff there, including Dr. Bryant, the neonatologist.  I went to the room and introduced myself to the patient.  She greeted me and denied wanting to speak with me as she was \"focused on my baby.\"  I gave the NICU staff the EPDS and asked that mom complete before she returned to &.        I returned to M&[facility] in order to speak with dad; when I arrived at the room, other twin, dad and older son were asleep; son in the hospital bed and dad on the couch.  I waited for mom to return from NICU; she apparently kept staff in NICU quite engaged and occupied for >30 minutes.  When she returned to the unit, she quickly declined speaking with me.  As the patient declined services multiple times, refused to complete EPDS, unable to assess this patient today.  If patient is willing to be assessed tomorrow, consider requesting a follow-up consult again; however, if patient is ambivalent, consider waiting another day.  I recommend we keep SW service involved with this patient through the next several days or until discharge.    "

## 2017-02-04 NOTE — PLAN OF CARE
After twin #2 was transferred to NICU and twin #1 was here in the nursery having just finished his car seat test...FOB stormed into nursery, appeared upset. FOB stood with his arms crossed and scowled face hovering over twin #1 and nurses. He didn t say anything, ask any questions, or introduce himself, just stood and stared until writer asked  Hi! Are you dad?!  He then nodded his head and asked what was going on. Writer explained that we called the NNP to come evaluate twin #2 after a dusky spell from spitting up. Writer explained that the NNP just wanted to monitor him in the NICU and that she could explain further the plan and that she had already talked with mom about what was going on. Writer explained that he was more than welcome to visit the NICU and offered to walk him down to show him where the NICU was, FOB declined.  FOB questioned why both of the twins were in the nursery at the same time if the car seat tests were not being done at the same time. Writer explained that mom made a plan with primary RN to have both babies in the NBN until the testing was done.  Twin 1 was brought to room before car seat test to attempt to breastfeed, mom declined and said we could just give him his bottle supplement. FOB asked few questions to the nurses in the NBN in a very  accusatory manner.  Writer offered to let father hold his baby, he declined at that time and left the NBN.  Charge nurse and primary RN updated on encounter.

## 2017-02-04 NOTE — PLAN OF CARE
Problem: Goal Outcome Summary  Goal: Goal Outcome Summary  Outcome: Improving  VS stable. Ibuprofen, oxy and Tylenol given as available, patient stated pain was controlled adequately. Patient needs reminders for self cares- eating, going to the bathroom. Patient requested help changing pad. Patient requires significant assistance with infants, latching, changing, putting in and out of bassinets. Patient required reminders when infants need to eat. Bonding well with infants. Twin 2 to NICU at 0600, see notes in Twin2 chart. Mother upset and crying when updated by NNP.

## 2017-02-04 NOTE — LACTATION NOTE
Lactation in to see patient. Patient has twins that she is nursing, then she is supplementing with formula. Writer saw John at breast. Baby nursed well with lots of swallows heard. Did not get the opportunity to see Rodney. Both babies supplementing with bottles of formula afterwards. Twin one at 9.5% weight loss, twin two at just over 7%weight loss. Patient encouraged to increase volume of supplement, and bedside nurse also discussed feeding babies more frequently, as mom goes a long time between feeds. Will call prn

## 2017-02-05 VITALS
TEMPERATURE: 97.8 F | SYSTOLIC BLOOD PRESSURE: 136 MMHG | DIASTOLIC BLOOD PRESSURE: 84 MMHG | HEIGHT: 68 IN | HEART RATE: 88 BPM | WEIGHT: 275 LBS | RESPIRATION RATE: 20 BRPM | OXYGEN SATURATION: 98 % | BODY MASS INDEX: 41.68 KG/M2

## 2017-02-05 PROCEDURE — 25000132 ZZH RX MED GY IP 250 OP 250 PS 637: Performed by: OBSTETRICS & GYNECOLOGY

## 2017-02-05 RX ORDER — AMOXICILLIN 250 MG
1-2 CAPSULE ORAL 2 TIMES DAILY PRN
Qty: 30 TABLET | Refills: 1 | Status: SHIPPED | OUTPATIENT
Start: 2017-02-05

## 2017-02-05 RX ORDER — OXYCODONE HYDROCHLORIDE 5 MG/1
5-10 TABLET ORAL EVERY 4 HOURS PRN
Qty: 30 TABLET | Refills: 0 | Status: SHIPPED | OUTPATIENT
Start: 2017-02-05 | End: 2017-11-06

## 2017-02-05 RX ORDER — IBUPROFEN 800 MG/1
800 TABLET, FILM COATED ORAL EVERY 8 HOURS PRN
Qty: 30 TABLET | Refills: 1 | Status: SHIPPED | OUTPATIENT
Start: 2017-02-05 | End: 2017-11-06

## 2017-02-05 RX ORDER — LORAZEPAM 0.5 MG/1
0.5 TABLET ORAL EVERY 8 HOURS PRN
Qty: 10 TABLET | Refills: 0 | Status: SHIPPED | OUTPATIENT
Start: 2017-02-05

## 2017-02-05 RX ADMIN — OXYCODONE HYDROCHLORIDE 5 MG: 5 TABLET ORAL at 09:07

## 2017-02-05 RX ADMIN — SENNOSIDES AND DOCUSATE SODIUM 2 TABLET: 8.6; 5 TABLET ORAL at 09:07

## 2017-02-05 RX ADMIN — ACETAMINOPHEN 650 MG: 325 TABLET, FILM COATED ORAL at 11:20

## 2017-02-05 RX ADMIN — OXYCODONE HYDROCHLORIDE 5 MG: 5 TABLET ORAL at 13:09

## 2017-02-05 RX ADMIN — OXYCODONE HYDROCHLORIDE 10 MG: 5 TABLET ORAL at 03:05

## 2017-02-05 RX ADMIN — OXYCODONE HYDROCHLORIDE 10 MG: 5 TABLET ORAL at 07:05

## 2017-02-05 RX ADMIN — OXYCODONE HYDROCHLORIDE 5 MG: 5 TABLET ORAL at 05:11

## 2017-02-05 RX ADMIN — ACETAMINOPHEN 650 MG: 325 TABLET, FILM COATED ORAL at 07:05

## 2017-02-05 RX ADMIN — OXYCODONE HYDROCHLORIDE 10 MG: 5 TABLET ORAL at 11:20

## 2017-02-05 RX ADMIN — IBUPROFEN 800 MG: 400 TABLET ORAL at 00:07

## 2017-02-05 RX ADMIN — IBUPROFEN 800 MG: 400 TABLET ORAL at 07:05

## 2017-02-05 RX ADMIN — OXYCODONE HYDROCHLORIDE 5 MG: 5 TABLET ORAL at 01:12

## 2017-02-05 RX ADMIN — IBUPROFEN 800 MG: 400 TABLET ORAL at 13:09

## 2017-02-05 RX ADMIN — ACETAMINOPHEN 650 MG: 325 TABLET, FILM COATED ORAL at 03:05

## 2017-02-05 NOTE — PLAN OF CARE
Problem: Goal Outcome Summary  Goal: Goal Outcome Summary  Outcome: No Change  Patient has had a good evening. Support person and son no here this shift. Patient was able to rest between feeds. Lots of assistance given with breast feeding as patient carpal tunnel is making hands sore.  Incision clean and intact. Rash noted above incision on abdomen. Patient up to bathroom voiding well. Nipples tender and very bruised. Using gel pads. Abdomen slightly distended. Pumping after baby nurses to send milk down to twin 2 in nicu. Taking motrin, tylenol and oxy for discomfort. Patient able to talk through her feelings and frustrations this shift. Is moving into a new apartment. [facility] has apartment in [place]. [facility] has social workers in place at home.

## 2017-02-05 NOTE — PLAN OF CARE
Problem: Goal Outcome Summary  Goal: Goal Outcome Summary  Outcome: Adequate for Discharge Date Met:  17  Patient a lot calmer today and able to care for herself and  more independently, but still requires reminders to feed infant. Medicated with ibuprofen, oxycodone, and tylenol this shift for pain. Incision healing well with no drainage. Using motherlove, hydrogel pads, and a t-pump for comfort. Preparing for discharge.     Dr. Huerta in to see patient this AM, discharge orders placed. Medications sent home with patient. Seen by social work and a psychologist prior to discharge. Pump given. All education completed, no further questions at this time. Twin 1 remaining a patient and twin 2 remaining in NICU at this time. Patient will be staying bed and board.

## 2017-02-05 NOTE — DISCHARGE INSTRUCTIONS
Discharge Instructions and Follow-Up:    Discharge diet:  Regular    Discharge activity:  No driving while using narcotics or for 1 week.  No heavy lifting greater than 15 pounds for 6 week(s)  Pelvic rest: abstain from intercourse and do not use tampons for 6 week(s)    Discharge follow-up:  Follow up with Dr. Huerta in 1-2 weeks and in 6 weeks. Continue counseling with [facility].    6 week glucola testing.     Outpatient therapy:  None     Lines and drains:  None     Wound care:  Keep clean and dry. Remove any steri strips in 1 week.    Other instructions:  Call 911 or present to ED for any thoughts of harming self or others. Call 911 if in any danger of harm of self or children by others.                Postop  Birth Instructions  Lactation Phone Number: [phone]      Activity       Do not lift more than 10 pounds for 6 weeks after surgery.  Ask family and friends for help when you need it.    No driving until you have stopped taking your pain medications (usually two weeks after surgery).    No heavy exercise or activity for 6 weeks.  Don't do anything that will put a strain on your surgery site.    Don't strain when using the toilet.  Your care team may prescribe a stool softener if you have problems with your bowel movements.     To care for your incision:       Keep the incision clean and dry.    Do not soak your incision in water. No swimming or hot tubs until it has fully healed. You may soak in the bathtub if the water level is below your incision.    Do not use peroxide, gel, cream, lotion, or ointment on your incision.    Adjust your clothes to avoid pressure on your surgery site (check the elastic in your underwear for example).     You may see a small amount of clear or pink drainage and this is normal.  Check with your health care provider:       If the drainage increases or has an odor.    If the incision reddens, you have swelling, or develop a rash.    If you  have increased pain and the medicine we prescribed doesn't help.    If you have a fever above 100.4 F (38 C) with or without chills when placing thermometer under your tongue.   The area around your incision (surgery wound), will feel numb.  This is normal. The numbness should go away in less than a year.     Keep your hands clean:  Always wash your hands before touching your incision (surgery wound). This helps reduce your risk of infection. If your hands aren't dirty, you may use an alcohol hand-rub to clean your hands. Keep your nails clean and short.    Call your healthcare provider if you have any of these symptoms:       You soak a sanitary pad with blood within 1 hour, or you see blood clots larger than a golf ball.    Bleeding that lasts more than 6 weeks.    Vaginal discharge that smells bad.    Severe pain, cramping or tenderness in your lower belly area.    A need to urinate more frequently (use the toilet more often), more urgently (use the toilet very quickly), or it burns when you urinate.    Nausea and vomiting.    Redness, swelling or pain around a vein in your leg.    Problems breastfeeding or a red or painful area on your breast.    Chest pain and cough or are gasping for air.    Problems with coping with sadness, anxiety or depression. If you have concerns about hurting yourself or the baby, call your provider immediately.      You have questions or concerns after you return home.
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
## 2017-02-05 NOTE — PLAN OF CARE
"Problem: Goal Outcome Summary  Goal: Goal Outcome Summary  Outcome: Improving  VS stable. Ibuprofen, tylenol and oxy given as available. Patient states pain is under controlled. Ibuprofen held for one hour per patient request at 0600 to increase sleep time. Nurse encouraged patient to be independent with cares this shift as much as she is able. Patient agreed. Nurse in room for support, but patient independent with cares for self and twin 1. Patient needed reminders of when to feed infant and when to pump. Nurse suggested writing feeds and medications down so she could remember when infants needed to eat and when to take medications once discharged, patient agreed. Patient stated she feels \"in control and that the night went well.\" Patient bonding well with infant and plans to visit twin 2 in NICU in the morning. Patient would like to bed and board at discharge.         "
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
## 2017-02-06 NOTE — PROGRESS NOTES
D) SW following Rocky will be staying bed and board until NICU baby is discharged.  She completed KARSTEN for herself and twin sons to obtain medical records from [facility].   MARLENE GENTILE Delivered these KARSTEN's to HIM today.  SW also offered family Patient Relations visit and they accepted. SW made referral.  P) SW following pt's baby John who is in the NICU.

## 2017-02-06 NOTE — LACTATION NOTE
No lactation visit prior to discharge per RN request.  RN offered patient discharge information about lactation resources and phone number.

## 2017-02-06 NOTE — CONSULTS
D/I) ELISHA following John and his twin brother Rodney and their family. BUBBA lives in [place] and LISA lives in [place].  ELISHA met with parents as well as spoke with Nisha Mcallister [phone] who is assigned to the family from [facility] Parent Support Outreach Program.  LISA feels Nisha is a strong support to her.  Rocky discussed counselors through [facility] and [facility] agency and [employer] and [facility]IC. Who are their support. Rocky states that neither she or BUBBA have any family to help them.  Family has chosen against following up at [facility]. They will take [facility]. SW gave Rocky that number to make appointments. Rocky has declined to speak with psychologist and psychiatrist.  A) Rocky is A&O, fidgety, agitated and seems very distressed as she doesn't feel well and is very anxious and overwhelmed. She has repeated many times to staff that she doesn't know how she is going to do this at home. She has also had difficulty initiating cares for babies.  Her 8 yr old son Pierce is at bedside and the family has been here for 6 days. At time of interview BUBBA was holding rocking Rodney and Pierce is sitting next to BUBBA. Baby John is in the NICU and FOB visits there often.   Parents do not seem to comprehend care needs of their babies. BUBBA has behaved in an angry, frustrated manner. Parents are highly guarded in whom they speak to etc. Based on parents level of stress, limited supports, poor coping skills as well as limited comprehension, the family seems at risk and will need very close follow-up in the community.  P) ELISHA made referral to PHN and reported concerns to Nisha Mcallister at [facility].

## 2017-11-06 ENCOUNTER — HOSPITAL ENCOUNTER (EMERGENCY)
Facility: CLINIC | Age: 42
Discharge: HOME | End: 2017-11-06
Attending: EMERGENCY MEDICINE | Admitting: EMERGENCY MEDICINE
Payer: COMMERCIAL

## 2017-11-06 VITALS
HEART RATE: 72 BPM | RESPIRATION RATE: 16 BRPM | SYSTOLIC BLOOD PRESSURE: 122 MMHG | TEMPERATURE: 99.1 F | OXYGEN SATURATION: 98 % | BODY MASS INDEX: 36.89 KG/M2 | HEIGHT: 68 IN | DIASTOLIC BLOOD PRESSURE: 75 MMHG | WEIGHT: 243.39 LBS

## 2017-11-06 DIAGNOSIS — H66.001 ACUTE SUPPURATIVE OTITIS MEDIA OF RIGHT EAR WITHOUT SPONTANEOUS RUPTURE OF TYMPANIC MEMBRANE, RECURRENCE NOT SPECIFIED: ICD-10-CM

## 2017-11-06 PROCEDURE — 99283 EMERGENCY DEPT VISIT LOW MDM: CPT

## 2017-11-06 PROCEDURE — 25000132 ZZH RX MED GY IP 250 OP 250 PS 637

## 2017-11-06 PROCEDURE — 25000132 ZZH RX MED GY IP 250 OP 250 PS 637: Performed by: EMERGENCY MEDICINE

## 2017-11-06 RX ORDER — IBUPROFEN 800 MG/1
TABLET, FILM COATED ORAL
Status: COMPLETED
Start: 2017-11-06 | End: 2017-11-06

## 2017-11-06 RX ORDER — ACETAMINOPHEN 325 MG/1
650 TABLET ORAL ONCE
Status: COMPLETED | OUTPATIENT
Start: 2017-11-06 | End: 2017-11-06

## 2017-11-06 RX ORDER — IBUPROFEN 800 MG/1
800 TABLET, FILM COATED ORAL EVERY 8 HOURS PRN
Qty: 30 TABLET | Refills: 0 | Status: SHIPPED | OUTPATIENT
Start: 2017-11-06

## 2017-11-06 RX ORDER — IBUPROFEN 800 MG/1
800 TABLET, FILM COATED ORAL ONCE
Status: COMPLETED | OUTPATIENT
Start: 2017-11-06 | End: 2017-11-06

## 2017-11-06 RX ORDER — OXYCODONE HYDROCHLORIDE 5 MG/1
5 TABLET ORAL EVERY 6 HOURS PRN
Qty: 10 TABLET | Refills: 0 | Status: SHIPPED | OUTPATIENT
Start: 2017-11-06 | End: 2019-06-19

## 2017-11-06 RX ORDER — ACETAMINOPHEN 325 MG/1
TABLET ORAL
Status: COMPLETED
Start: 2017-11-06 | End: 2017-11-06

## 2017-11-06 RX ORDER — CEFDINIR 300 MG/1
600 CAPSULE ORAL DAILY
Qty: 18 CAPSULE | Refills: 0 | Status: SHIPPED | OUTPATIENT
Start: 2017-11-07 | End: 2017-11-16

## 2017-11-06 RX ORDER — CEFDINIR 300 MG/1
600 CAPSULE ORAL ONCE
Status: COMPLETED | OUTPATIENT
Start: 2017-11-06 | End: 2017-11-06

## 2017-11-06 RX ADMIN — CEFDINIR 600 MG: 300 CAPSULE ORAL at 02:01

## 2017-11-06 RX ADMIN — ACETAMINOPHEN 650 MG: 325 TABLET, FILM COATED ORAL at 01:15

## 2017-11-06 RX ADMIN — IBUPROFEN 800 MG: 800 TABLET, FILM COATED ORAL at 01:15

## 2017-11-06 RX ADMIN — ACETAMINOPHEN 650 MG: 325 TABLET ORAL at 01:15

## 2017-11-06 ASSESSMENT — ENCOUNTER SYMPTOMS
RHINORRHEA: 1
COUGH: 0
FEVER: 1
SORE THROAT: 1

## 2017-11-06 NOTE — ED PROVIDER NOTES
History     Chief Complaint:  Otalgia and Throat Pain      HPI   Rocky Lam is a 42 year old female who presents to the emergency department today for evaluation of otalgia and throat pain. The patient reports that she has had congestion, runny nose, and subjective fever since yesterday. She states she developed right ear pain beginning today around 1600 and has since progressively worsened. She states she tried taking a Neti pot, Theraflu, and vinegar in her ear with no relief of symptoms. Therefore, she presents to the emergency department for evaluation. She reports her children have been sick recently at home with the youngest on an antibiotic for ear ache and another child has a virus. She denies using tylenol or ibuprofen for pain management.    Allergies:  Penicillins    Medications:    oxyCODONE (ROXICODONE) 5 MG IR tablet  ibuprofen (ADVIL/MOTRIN) 800 MG tablet  LORazepam (ATIVAN) 0.5 MG tablet  senna-docusate (SENOKOT-S;PERICOLACE) 8.6-50 MG per tablet  sertraline (ZOLOFT) 50 MG tablet  Cyanocobalamin (VITAMIN B 12 PO)  calcium carbonate (OS-SANTOSH 500 MG [place]. [place]) 500 MG tablet  Ascorbic Acid (VITAMIN C PO)  Acetaminophen (TYLENOL PO)  Prenatal Vit-Fe Fumarate-FA (PRENATAL MULTIVITAMIN  PLUS IRON) 27-0.8 MG TABS    Past Medical History:    Anemia  Cancer  Diabetes  GERD  Gastroparesis  Hypertension  Mental disorder  Numbness and tingling  Postpartum depression  Shingles  Wounds and injuries    Past Surgical History:      LEEP Tx, cervical  [place] teeth    Family History:    History reviewed. No pertinent family history.     Social History:  The patient was unaccompanied to the ED.  Smoking Status: Never  Alcohol Use: No  Marital Status:  Single     Review of Systems   Constitutional: Positive for fever (subjective).   HENT: Positive for congestion, ear pain, rhinorrhea and sore throat.    Respiratory: Negative for cough.    All other systems reviewed and are negative.    Physical Exam  "    Patient Vitals for the past 24 hrs:   BP Temp Temp src Pulse Heart Rate Resp SpO2 Height Weight   11/06/17 0043 (!) 143/94 99.1  F (37.3  C) Temporal 83 83 18 98 % 1.727 m (5' 8\") 110.4 kg (243 lb 6.2 oz)       Physical Exam  Nursing note and vitals reviewed.  Constitutional: Cooperative.   HENT:   Mouth/Throat: Moist mucous membranes. Posterior pharynx normal.  Right TM is dark red and bulging. No drainage. Left TM normal. No mastoid tenderness bilaterally.   Eyes: EOMI, nonicteric sclera  Cardiovascular: Normal rate, regular rhythm, no murmurs, rubs, or gallops  Pulmonary/Chest: Effort normal and breath sounds normal. No respiratory distress. No wheezes. No rales.   Abdominal: Soft. Nontender, nondistended, no guarding or rigidity. BS present.   Musculoskeletal: Normal range of motion.   Neurological: Alert. Moves all extremities spontaneously.   Skin: Skin is warm and dry. No rash noted.   Psychiatric: Normal mood and affect.       Emergency Department Course     Interventions:  0115 Advil/Motrin 800 mg PO  0115 tylenol 650 mg PO  0115 Omnicef 600 mg PO     Emergency Department Course:  Nursing notes and vitals reviewed.  0058: I performed an exam of the patient as documented above.   Findings and plan explained to the Patient. Patient discharged home with instructions regarding supportive care, medications, and reasons to return. The importance of close follow-up was reviewed. The patient was prescribed omnicef.    Impression & Plan      Medical Decision Making:  Rocky Lam is a 42 year old female who presents for evaluation of right otalgia and sore throat.  The patient has an exam consistent with acute suppurative otitis media on the right side.  There is no sign of mastoiditis, meningitis, perforation, mass, dental abscess, or peritonsillar abscess. There is no evidence of otitis externa.  The patient will be started on antibiotics and may take OTC pain medications and small rx for oxy prescribed.  " Return instructions for ED care given. Regardless they should see primary care doctor for ear recheck in 3-4 weeks.  See primary physician in 3 days if symptoms not better or if new symptoms develop.    Diagnosis:    ICD-10-CM   1. Acute suppurative otitis media of right ear without spontaneous rupture of tympanic membrane, recurrence not specified H66.001   2.  delivery delivered O82       Disposition:  discharged to home    Current Discharge Medication List      START taking these medications    Details   cefdinir (OMNICEF) 300 MG capsule Take 2 capsules (600 mg) by mouth daily for 9 days  Qty: 18 capsule, Refills: 0         CONTINUE these medications which have CHANGED    Details   oxyCODONE IR (ROXICODONE) 5 MG tablet Take 1 tablet (5 mg) by mouth every 6 hours as needed for moderate to severe pain or pain  Qty: 10 tablet, Refills: 0    Associated Diagnoses:  delivery delivered      ibuprofen (ADVIL/MOTRIN) 800 MG tablet Take 1 tablet (800 mg) by mouth every 8 hours as needed for other (cramping)  Qty: 30 tablet, Refills: 0    Associated Diagnoses:  delivery delivered             Scribe Disclosure:  Brigette TUTTLE, am serving as a scribe at 12:49 AM on 2017 to document services personally performed by Drew Anne MD based on my observations and the provider's statements to me.   2017   [facility] EMERGENCY DEPARTMENT       Drew Anne MD  17 [id number]

## 2017-11-06 NOTE — ED AVS SNAPSHOT
[facility] Department    [address]    [facility] [address]    Phone:  [phone]    Fax:  [phone]                                       Rocky Lam   MRN: [id number]    Department:  [facility] Emergency Department   Date of Visit:  11/6/2017           After Visit Summary Signature Page     I have received my discharge instructions, and my questions have been answered. I have discussed any challenges I see with this plan with the nurse or doctor.    ..........................................................................................................................................  Patient/Patient Representative Signature      ..........................................................................................................................................  Patient Representative Print Name and Relationship to Patient    ..................................................               ................................................  Date                                            Time    ..........................................................................................................................................  Reviewed by Signature/Title    ...................................................              ..............................................  Date                                                            Time

## 2017-11-06 NOTE — ED AVS SNAPSHOT
[facility] Emergency Department    [address] [facility] [address]    Phone:  [phone]    Fax:  [phone]                                       Rocky Lam   MRN: [id number]    Department:  [facility] Emergency Department   Date of Visit:  2017           Patient Information     Date Of Birth          1975        Your diagnoses for this visit were:     Acute suppurative otitis media of right ear without spontaneous rupture of tympanic membrane, recurrence not specified      delivery delivered        You were seen by Carlos Monge MD and Drew Anne MD.      Follow-up Information     Follow up with Zulema Lombardo NP. Schedule an appointment as soon as possible for a visit in 2 days.    Specialty:  Nurse Practitioner    Contact information:    [address] [address] [phone]          Follow up with [facility] Emergency Department.    Specialty:  EMERGENCY MEDICINE    Why:  As needed, If symptoms worsen    Contact information:    [address] [facility] [address] [phone]      Discharge References/Attachments     OTITIS MEDIA, ANTIBIOTIC TREATMENT (ADULT) (ENGLISH)      24 Hour Appointment Hotline       To make an appointment at any [facility], call [phone] ([phone]). If you don't have a family doctor or clinic, we will help you find one. [facility] clinics are conveniently located to serve the needs of you and your family.             Review of your medicines      START taking        Dose / Directions Last dose taken    cefdinir 300 MG capsule   Commonly known as:  OMNICEF   Dose:  600 mg   Quantity:  18 capsule   Start taking on:  2017        Take 2 capsules (600 mg) by mouth daily for 9 days   Refills:  0          CONTINUE these medicines which may have CHANGED, or have new prescriptions. If we are uncertain of the size of  tablets/capsules you have at home, strength may be listed as something that might have changed.        Dose / Directions Last dose taken    oxyCODONE IR 5 MG tablet   Commonly known as:  ROXICODONE   Dose:  5 mg   What changed:    - how much to take  - when to take this   Quantity:  10 tablet        Take 1 tablet (5 mg) by mouth every 6 hours as needed for moderate to severe pain or pain   Refills:  0          Our records show that you are taking the medicines listed below. If these are incorrect, please call your family doctor or clinic.        Dose / Directions Last dose taken    calcium carbonate 1250 MG tablet   Commonly known as:  OS-SANTOSH 500 mg [place]. Ca   Dose:  500 mg        Take 500 mg by mouth 2 times daily   Refills:  0        ibuprofen 800 MG tablet   Commonly known as:  ADVIL/MOTRIN   Dose:  800 mg   Quantity:  30 tablet        Take 1 tablet (800 mg) by mouth every 8 hours as needed for other (cramping)   Refills:  0        LORazepam 0.5 MG tablet   Commonly known as:  ATIVAN   Dose:  0.5 mg   Quantity:  10 tablet        Take 1 tablet (0.5 mg) by mouth every 8 hours as needed for anxiety (avoid breastfeeding for 2-3 hours after dosing)   Refills:  0        prenatal multivitamin plus iron 27-0.8 MG Tabs per tablet   Dose:  1 tablet        Take 1 tablet by mouth daily   Refills:  0        senna-docusate 8.6-50 MG per tablet   Commonly known as:  SENOKOT-S;PERICOLACE   Dose:  1-2 tablet   Quantity:  30 tablet        Take 1-2 tablets by mouth 2 times daily as needed for constipation   Refills:  1        sertraline 50 MG tablet   Commonly known as:  ZOLOFT   Dose:  50 mg   Quantity:  90 tablet        Take 1 tablet (50 mg) by mouth daily   Refills:  3        TYLENOL PO   Dose:  325 mg        Take 325 mg by mouth every 8 hours as needed   Refills:  0        VITAMIN B 12 PO        Take by mouth daily   Refills:  0        VITAMIN C PO   Dose:  500 mg        Take 500 mg by mouth daily   Refills:  0                 Prescriptions were sent or printed at these locations (3 Prescriptions)                   Other Prescriptions                Printed at Department/Unit printer (3 of 3)         oxyCODONE IR (ROXICODONE) 5 MG tablet               ibuprofen (ADVIL/MOTRIN) 800 MG tablet               cefdinir (OMNICEF) 300 MG capsule                Orders Needing Specimen Collection     None      Pending Results     No orders found from 11/4/2017 to 11/7/2017.            Pending Culture Results     No orders found from 11/4/2017 to 11/7/2017.            Pending Results Instructions     If you had any lab results that were not finalized at the time of your Discharge, you can call the ED Lab Result RN at [phone]. You will be contacted by this team for any positive Lab results or changes in treatment. The nurses are available 7 days a week from 10A to 6:30P.  You can leave a message 24 hours per day and they will return your call.        Test Results From Your Hospital Stay               Clinical Quality Measure: Blood Pressure Screening     Your blood pressure was checked while you were in the emergency department today. The last reading we obtained was  BP: (!) 143/94 . Please read the guidelines below about what these numbers mean and what you should do about them.  If your systolic blood pressure (the top number) is less than 120 and your diastolic blood pressure (the bottom number) is less than 80, then your blood pressure is normal. There is nothing more that you need to do about it.  If your systolic blood pressure (the top number) is 120-139 or your diastolic blood pressure (the bottom number) is 80-89, your blood pressure may be higher than it should be. You should have your blood pressure rechecked within a year by a primary care provider.  If your systolic blood pressure (the top number) is 140 or greater or your diastolic blood pressure (the bottom number) is 90 or greater, you may have high blood pressure. High blood  "pressure is treatable, but if left untreated over time it can put you at risk for heart attack, stroke, or kidney failure. You should have your blood pressure rechecked by a primary care provider within the next 4 weeks.  If your provider in the emergency department today gave you specific instructions to follow-up with your doctor or provider even sooner than that, you should follow that instruction and not wait for up to 4 weeks for your follow-up visit.        Thank you for choosing [facility]       Thank you for choosing [facility] for your care. Our goal is always to provide you with excellent care. Hearing back from our patients is one way we can continue to improve our services. Please take a few minutes to complete the written survey that you may receive in the mail after you visit with us. Thank you!        [employer] Information     [employer] lets you send messages to your doctor, view your test results, renew your prescriptions, schedule appointments and more. To sign up, go to [email or web address] . Click on \"Log in\" on the left side of the screen, which will take you to the Welcome page. Then click on \"Sign up Now\" on the right side of the page.     You will be asked to enter the access code listed below, as well as some personal information. Please follow the directions to create your username and password.     Your access code is: [email or web address]  Expires: 2018  1:11 AM     Your access code will  in 90 days. If you need help or a new code, please call your [facility] clinic or [phone].        Care EveryWhere ID     This is your Care EveryWhere ID. This could be used by other organizations to access your [facility] medical records  [id number]        Equal Access to Services     FLAVIO STRONG : Orlando Reddy, rudi cruz, cindy olivera. So [facility] [phone].    ATENCIÓN: Si habla español, tiene a ace disposición servicios " lori de asistencia lingüística. Tyrel cote [phone].    We comply with applicable federal civil rights laws and [place] laws. We do not discriminate on the basis of race, color, national origin, age, disability, sex, sexual orientation, or gender identity.            After Visit Summary       This is your record. Keep this with you and show to your community pharmacist(s) and doctor(s) at your next visit.

## 2019-06-19 ENCOUNTER — APPOINTMENT (OUTPATIENT)
Dept: CT IMAGING | Facility: CLINIC | Age: 44
End: 2019-06-19
Attending: INTERNAL MEDICINE
Payer: COMMERCIAL

## 2019-06-19 ENCOUNTER — HOSPITAL ENCOUNTER (EMERGENCY)
Facility: CLINIC | Age: 44
Discharge: HOME | End: 2019-06-19
Attending: INTERNAL MEDICINE | Admitting: INTERNAL MEDICINE
Payer: COMMERCIAL

## 2019-06-19 VITALS
DIASTOLIC BLOOD PRESSURE: 76 MMHG | SYSTOLIC BLOOD PRESSURE: 121 MMHG | HEART RATE: 66 BPM | RESPIRATION RATE: 20 BRPM | OXYGEN SATURATION: 98 % | TEMPERATURE: 97.7 F

## 2019-06-19 DIAGNOSIS — K52.9 COLITIS: ICD-10-CM

## 2019-06-19 LAB
ALBUMIN SERPL-MCNC: 3.6 G/DL (ref 3.4–5)
ALBUMIN UR-MCNC: NEGATIVE MG/DL
ALP SERPL-CCNC: 79 U/L (ref 40–150)
ALT SERPL W P-5'-P-CCNC: 29 U/L (ref 0–50)
ANION GAP SERPL CALCULATED.3IONS-SCNC: 7 MMOL/L (ref 3–14)
APPEARANCE UR: CLEAR
AST SERPL W P-5'-P-CCNC: 17 U/L (ref 0–45)
BASOPHILS # BLD AUTO: 0 10E9/L (ref 0–0.2)
BASOPHILS NFR BLD AUTO: 0.2 %
BILIRUB SERPL-MCNC: 0.6 MG/DL (ref 0.2–1.3)
BILIRUB UR QL STRIP: NEGATIVE
BUN SERPL-MCNC: 12 MG/DL (ref 7–30)
C DIFF TOX B STL QL: NEGATIVE
CALCIUM SERPL-MCNC: 8.9 MG/DL (ref 8.5–10.1)
CHLORIDE SERPL-SCNC: 107 MMOL/L (ref 94–109)
CO2 SERPL-SCNC: 24 MMOL/L (ref 20–32)
COLOR UR AUTO: ABNORMAL
CREAT SERPL-MCNC: 0.6 MG/DL (ref 0.52–1.04)
DIFFERENTIAL METHOD BLD: ABNORMAL
EOSINOPHIL # BLD AUTO: 0.2 10E9/L (ref 0–0.7)
EOSINOPHIL NFR BLD AUTO: 1.2 %
ERYTHROCYTE [DISTWIDTH] IN BLOOD BY AUTOMATED COUNT: 14.3 % (ref 10–15)
GFR SERPL CREATININE-BSD FRML MDRD: >90 ML/MIN/{1.73_M2}
GLUCOSE SERPL-MCNC: 103 MG/DL (ref 70–99)
GLUCOSE UR STRIP-MCNC: NEGATIVE MG/DL
HCG UR QL: NEGATIVE
HCT VFR BLD AUTO: 44.2 % (ref 35–47)
HGB BLD-MCNC: 14.1 G/DL (ref 11.7–15.7)
HGB UR QL STRIP: ABNORMAL
IMM GRANULOCYTES # BLD: 0.1 10E9/L (ref 0–0.4)
IMM GRANULOCYTES NFR BLD: 0.4 %
KETONES UR STRIP-MCNC: NEGATIVE MG/DL
LEUKOCYTE ESTERASE UR QL STRIP: NEGATIVE
LIPASE SERPL-CCNC: 148 U/L (ref 73–393)
LYMPHOCYTES # BLD AUTO: 1.6 10E9/L (ref 0.8–5.3)
LYMPHOCYTES NFR BLD AUTO: 8.8 %
MCH RBC QN AUTO: 27.1 PG (ref 26.5–33)
MCHC RBC AUTO-ENTMCNC: 31.9 G/DL (ref 31.5–36.5)
MCV RBC AUTO: 85 FL (ref 78–100)
MONOCYTES # BLD AUTO: 0.7 10E9/L (ref 0–1.3)
MONOCYTES NFR BLD AUTO: 3.5 %
MUCOUS THREADS #/AREA URNS LPF: PRESENT /LPF
NEUTROPHILS # BLD AUTO: 16 10E9/L (ref 1.6–8.3)
NEUTROPHILS NFR BLD AUTO: 85.9 %
NITRATE UR QL: NEGATIVE
NRBC # BLD AUTO: 0 10*3/UL
NRBC BLD AUTO-RTO: 0 /100
PH UR STRIP: 5 PH (ref 5–7)
PLATELET # BLD AUTO: 218 10E9/L (ref 150–450)
PLATELET # BLD EST: ABNORMAL 10*3/UL
POTASSIUM SERPL-SCNC: 4.1 MMOL/L (ref 3.4–5.3)
PROT SERPL-MCNC: 7.4 G/DL (ref 6.8–8.8)
RBC # BLD AUTO: 5.21 10E12/L (ref 3.8–5.2)
RBC #/AREA URNS AUTO: <1 /HPF (ref 0–2)
RBC MORPH BLD: ABNORMAL
SODIUM SERPL-SCNC: 138 MMOL/L (ref 133–144)
SOURCE: ABNORMAL
SP GR UR STRIP: 1.02 (ref 1–1.03)
SPECIMEN SOURCE: NORMAL
SQUAMOUS #/AREA URNS AUTO: 1 /HPF (ref 0–1)
UROBILINOGEN UR STRIP-MCNC: NORMAL MG/DL (ref 0–2)
WBC # BLD AUTO: 18.6 10E9/L (ref 4–11)
WBC #/AREA URNS AUTO: <1 /HPF (ref 0–5)

## 2019-06-19 PROCEDURE — 74177 CT ABD & PELVIS W/CONTRAST: CPT

## 2019-06-19 PROCEDURE — 25000128 H RX IP 250 OP 636: Performed by: INTERNAL MEDICINE

## 2019-06-19 PROCEDURE — 81001 URINALYSIS AUTO W/SCOPE: CPT | Performed by: INTERNAL MEDICINE

## 2019-06-19 PROCEDURE — 25000132 ZZH RX MED GY IP 250 OP 250 PS 637: Performed by: INTERNAL MEDICINE

## 2019-06-19 PROCEDURE — 83690 ASSAY OF LIPASE: CPT | Performed by: INTERNAL MEDICINE

## 2019-06-19 PROCEDURE — 85025 COMPLETE CBC W/AUTO DIFF WBC: CPT | Performed by: INTERNAL MEDICINE

## 2019-06-19 PROCEDURE — 96375 TX/PRO/DX INJ NEW DRUG ADDON: CPT

## 2019-06-19 PROCEDURE — 81025 URINE PREGNANCY TEST: CPT | Performed by: INTERNAL MEDICINE

## 2019-06-19 PROCEDURE — 25800030 ZZH RX IP 258 OP 636: Performed by: INTERNAL MEDICINE

## 2019-06-19 PROCEDURE — 99285 EMERGENCY DEPT VISIT HI MDM: CPT | Mod: 25

## 2019-06-19 PROCEDURE — 80053 COMPREHEN METABOLIC PANEL: CPT | Performed by: INTERNAL MEDICINE

## 2019-06-19 PROCEDURE — 96374 THER/PROPH/DIAG INJ IV PUSH: CPT | Mod: 59

## 2019-06-19 PROCEDURE — 87506 IADNA-DNA/RNA PROBE TQ 6-11: CPT | Performed by: INTERNAL MEDICINE

## 2019-06-19 PROCEDURE — 87493 C DIFF AMPLIFIED PROBE: CPT | Performed by: INTERNAL MEDICINE

## 2019-06-19 PROCEDURE — 96361 HYDRATE IV INFUSION ADD-ON: CPT

## 2019-06-19 RX ORDER — VANCOMYCIN HYDROCHLORIDE 50 MG/ML
125 KIT ORAL ONCE
Status: COMPLETED | OUTPATIENT
Start: 2019-06-19 | End: 2019-06-19

## 2019-06-19 RX ORDER — ONDANSETRON 4 MG/1
4 TABLET, ORALLY DISINTEGRATING ORAL EVERY 8 HOURS PRN
Qty: 10 TABLET | Refills: 0 | Status: SHIPPED | OUTPATIENT
Start: 2019-06-19 | End: 2019-06-22

## 2019-06-19 RX ORDER — VANCOMYCIN HYDROCHLORIDE 125 MG/1
125 CAPSULE ORAL 4 TIMES DAILY
Qty: 40 CAPSULE | Refills: 0 | Status: SHIPPED | OUTPATIENT
Start: 2019-06-19 | End: 2019-06-29

## 2019-06-19 RX ORDER — ONDANSETRON 2 MG/ML
4 INJECTION INTRAMUSCULAR; INTRAVENOUS EVERY 30 MIN PRN
Status: DISCONTINUED | OUTPATIENT
Start: 2019-06-19 | End: 2019-06-19 | Stop reason: HOSPADM

## 2019-06-19 RX ORDER — OXYCODONE HYDROCHLORIDE 5 MG/1
5 TABLET ORAL EVERY 6 HOURS PRN
Qty: 12 TABLET | Refills: 0 | Status: SHIPPED | OUTPATIENT
Start: 2019-06-19

## 2019-06-19 RX ORDER — HYDROMORPHONE HYDROCHLORIDE 1 MG/ML
0.5 INJECTION, SOLUTION INTRAMUSCULAR; INTRAVENOUS; SUBCUTANEOUS
Status: DISCONTINUED | OUTPATIENT
Start: 2019-06-19 | End: 2019-06-19 | Stop reason: HOSPADM

## 2019-06-19 RX ORDER — IOPAMIDOL 755 MG/ML
500 INJECTION, SOLUTION INTRAVASCULAR ONCE
Status: COMPLETED | OUTPATIENT
Start: 2019-06-19 | End: 2019-06-19

## 2019-06-19 RX ORDER — SODIUM CHLORIDE 9 MG/ML
1000 INJECTION, SOLUTION INTRAVENOUS CONTINUOUS
Status: DISCONTINUED | OUTPATIENT
Start: 2019-06-19 | End: 2019-06-19 | Stop reason: HOSPADM

## 2019-06-19 RX ADMIN — IOPAMIDOL 100 ML: 755 INJECTION, SOLUTION INTRAVENOUS at 16:42

## 2019-06-19 RX ADMIN — SODIUM CHLORIDE 1000 ML: 9 INJECTION, SOLUTION INTRAVENOUS at 18:02

## 2019-06-19 RX ADMIN — VANCOMYCIN HYDROCHLORIDE 125 MG: KIT at 18:05

## 2019-06-19 RX ADMIN — HYDROMORPHONE HYDROCHLORIDE 0.5 MG: 1 INJECTION, SOLUTION INTRAMUSCULAR; INTRAVENOUS; SUBCUTANEOUS at 18:02

## 2019-06-19 RX ADMIN — SODIUM CHLORIDE 65 ML: 9 INJECTION, SOLUTION INTRAVENOUS at 16:43

## 2019-06-19 RX ADMIN — ONDANSETRON HYDROCHLORIDE 4 MG: 2 INJECTION, SOLUTION INTRAMUSCULAR; INTRAVENOUS at 15:40

## 2019-06-19 RX ADMIN — SODIUM CHLORIDE 1000 ML: 9 INJECTION, SOLUTION INTRAVENOUS at 15:39

## 2019-06-19 ASSESSMENT — ENCOUNTER SYMPTOMS
VOMITING: 1
DIARRHEA: 1
BLOOD IN STOOL: 0
ABDOMINAL PAIN: 1
FEVER: 0
NAUSEA: 1
COUGH: 0

## 2019-06-19 NOTE — ED AVS SNAPSHOT
[facility] Emergency Department  [address]  [facility] [address]  Phone:  [phone]  Fax:  [phone]                                    Rocky Lam   MRN: [id number]    Department:  [facility] Emergency Department   Date of Visit:  6/19/2019           After Visit Summary Signature Page    I have received my discharge instructions, and my questions have been answered. I have discussed any challenges I see with this plan with the nurse or doctor.    ..........................................................................................................................................  Patient/Patient Representative Signature      ..........................................................................................................................................  Patient Representative Print Name and Relationship to Patient    ..................................................               ................................................  Date                                   Time    ..........................................................................................................................................  Reviewed by Signature/Title    ...................................................              ..............................................  Date                                               Time          22EPIC Rev 08/18

## 2019-06-19 NOTE — ED PROVIDER NOTES
"  History     Chief Complaint:  Multiple Complaints    HPI   Rocky Lam is a 43 year old female with a history of anemia, diabetes, hypertension, gastroparesis, and GERD who presents with nausea, vomiting, diarrhea, and abdominal pain. Two days ago, the patient reports having watery diarrhea. The next day the patient started experiencing nausea, vomiting, and abdominal pain. She reports taking tums and ibuprofen for her symptoms, but have found no relief. She describes the pain \"feels like my insides are coming out\" and overall feels \"deathly.\" The patient does have a history of C-Diff and reports this feels similar. Today, her symptoms have worsened which is why she presents to the ED. Here, the patient reports that today she has felt generally weak, and lightheaded. She denies any bloody stool, fever, alcohol use, cough, congestion, or any ill contact. Of note, the patient has a history of gastroparesis but reports has not experienced any episodes for years.     Allergies:  Penicillins     Medications:    Tylenol  Ibuprofen  Ativan  Senokot-S  Zoloft  Prenatal Vitamins  Albuterol Inhaler      Past Medical History:    Anemia  Cancer  Diabetes  GERD  Gastroparesis  HTN  Mental disorder  Numbness and tingling  Postpartum depression  Shingles    Past Surgical History:      LEEP [place]  [place] Teeth    Family History:    Colon Cancer  Diabetes  Heart Disease  HTN  Lymphoma  Mental disorder  Depression   Drug Abuse  Fibromyalgia    Social History:  Negative for tobacco use.  Negative for alcohol use.  Negative for drug use.  Marital Status:  Single [1]       Review of Systems   Constitutional: Negative for fever.   Respiratory: Negative for cough.    Gastrointestinal: Positive for abdominal pain, diarrhea, nausea and vomiting. Negative for blood in stool.   All other systems reviewed and are negative.      Physical Exam     Patient Vitals for the past 24 hrs:   BP Temp Temp src Pulse Resp SpO2   19 1830 " 112/70 -- -- 68 -- --   06/19/19 1630 117/65 -- -- 63 -- 98 %   06/19/19 1615 -- -- -- -- -- 98 %   06/19/19 1600 111/65 -- -- 64 -- 98 %   06/19/19 1545 110/67 -- -- 66 -- 96 %   06/19/19 1530 129/69 -- -- 72 -- 97 %   06/19/19 1447 123/61 97.7  F (36.5  C) Oral 74 20 98 %   06/19/19 1445 123/61 -- -- 75 -- --       Physical Exam   Constitutional:   Pleasant and cooperative   HENT:   Right Ear: Tympanic membrane normal.   Left Ear: Tympanic membrane normal.   Mouth/Throat: Oropharynx is clear and moist and mucous membranes are normal. No posterior oropharyngeal erythema.   Eyes: Conjunctivae are normal.   Neck: Neck supple.   Cardiovascular: Normal rate, regular rhythm and normal heart sounds.   Pulmonary/Chest: Effort normal and breath sounds normal.   Abdominal: Soft. Bowel sounds are normal. She exhibits no distension. There is tenderness in the epigastric area. There is no rebound and no guarding.   Musculoskeletal: Normal range of motion.   Neurological: She is alert.   Skin: Skin is warm and dry.   Psychiatric: She has a normal mood and affect.       Emergency Department Course   Imaging:  Radiographic findings were communicated with the patient who voiced understanding of the findings.    CT Abdomen/Pelvis with IV contrast:   1. Nonspecific colonic fluid may be due to enteritis.  2. 6 cm left adnexal/ovarian cyst. Small collapsing follicle right  ovary as per radiology.    Laboratory:  UA with micro: Blood trace (A), mucous present (A), o/w negative  HCG Qualitative urine: Negative    CBC: WBC: 18.6 (H), HGB: 14.1, PLT: 218  CMP: Glucose 103 (H), o/w WNL (Creatinine: 0.60)    Lipase: 148    Enteric Bacterial and Virus Panel KIANA stool: pending  Stool C. Diff PCR: pending    Interventions:  1539 NS 1L IV  1540 Zofran 4 mg IV  1802 NS 1L IV   Dilaudid 0.5 mg IV  1805 Firvanq 125 mg PO    Emergency Department Course:  Nursing notes and vitals reviewed. (7863) I performed an exam of the patient as documented  above.      IV inserted. Medicine administered as documented above. Blood drawn. This was sent to the lab for further testing, results above.    The patient provided a urine sample here in the emergency department. This was sent for laboratory testing, findings above.      The patient was sent for a CT abdomen pelvis while in the emergency department, findings above.      (1734) I rechecked the patient and discussed the results of her workup thus far.   (1846) I rechecked the patient to see if symptoms have improved.      Findings and plan explained to the Patient. Patient discharged home with instructions regarding supportive care, medications, and reasons to return. The importance of close follow-up was reviewed. The patient was prescribed Zofran, vancomycin, and oxycodone.     I personally reviewed the laboratory and imaging results with the Patient and answered all related questions prior to discharge.       Impression & Plan    Medical Decision Making:    Rocky Lam is a 43 year old female who presents to the emergency department with watery diarrhea, abdominal pain, and general weakness.  She does have a distant history of C. difficile.  Here she had diarrhea which appears very characteristic of this infection.  Formal testing is pending.  She does have significant leukocytosis.  CT was obtained which shows subtle findings of colitis.  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  She does not clearly meet criteria for severe disease though has leukocytosis.  I discussed with her options of inpatient versus outpatient management.  She indicates because of her responsibilities to her children she really is not able to be hospitalized.  With medication here she has been able to drink fluids and take a small amount of applesauce.  We have given an initial dose of oral vancomycin.  I will discharge the patient with a 10-day course of oral vancomycin along with oxycodone and Zofran for  symptomatic relief.  If worse she will need to return, otherwise follow-up in 2 days in clinic.    Diagnosis:    ICD-10-CM    1. Colitis K52.9     presumptive C. diff     Disposition:  discharged to home    Discharge Medications:     Medication List      Started    ondansetron 4 MG ODT tab  Commonly known as:  ZOFRAN ODT  4 mg, Oral, EVERY 8 HOURS PRN     vancomycin 125 MG capsule  Commonly known as:  VANCOCIN  125 mg, Oral, 4 TIMES DAILY        Modified    oxyCODONE 5 MG tablet  Commonly known as:  ROXICODONE  5 mg, Oral, EVERY 6 HOURS PRN  What changed:  reasons to take this          Scribe Disclosure:  I, Anastasia Collado, am serving as a scribe on 6/19/2019 at 2:59 PM to personally document services performed by Wilma Loyola MD based on my observations and the provider's statements to me.     Anastasia Collado  6/19/2019   [facility] EMERGENCY DEPARTMENT       Wilma Loyola MD  06/19/19 5536

## 2019-06-20 LAB
C COLI+JEJUNI+LARI FUSA STL QL NAA+PROBE: NOT DETECTED
EC STX1 GENE STL QL NAA+PROBE: NOT DETECTED
EC STX2 GENE STL QL NAA+PROBE: NOT DETECTED
ENTERIC PATHOGEN COMMENT: NORMAL
NOROV GI+II ORF1-ORF2 JNC STL QL NAA+PR: NOT DETECTED
RVA NSP5 STL QL NAA+PROBE: NOT DETECTED
SALMONELLA SP RPOD STL QL NAA+PROBE: NOT DETECTED
SHIGELLA SP+EIEC IPAH STL QL NAA+PROBE: NOT DETECTED
V CHOL+PARA RFBL+TRKH+TNAA STL QL NAA+PR: NOT DETECTED
Y ENTERO RECN STL QL NAA+PROBE: NOT DETECTED

## (undated) DEVICE — LINEN TOWEL PACK X10 5473

## (undated) DEVICE — PACK C-SECTION LF PL15OTA83B

## (undated) DEVICE — GLOVE PROTEXIS W/NEU-THERA 6.5  2D73TE65

## (undated) DEVICE — SU VICRYL 2-0 CT-1 36" J345H

## (undated) DEVICE — SUCTION CANISTER STRAW 65652-008

## (undated) DEVICE — SU VICRYL 0 CT-1 36" J346H

## (undated) DEVICE — GLOVE PROTEXIS W/NEU-THERA 7.5  2D73TE75

## (undated) DEVICE — DRSG ABDOMINAL 12X16"

## (undated) DEVICE — LINEN DRAPE 54X72" 5467

## (undated) DEVICE — LINEN FULL SHEET 5511

## (undated) DEVICE — BLADE CLIPPER SGL USE 9680

## (undated) DEVICE — CATH TRAY FOLEY 16FR DRAINAGE BAG STATLOCK 899916

## (undated) DEVICE — CAP BABY PINK/BLUE IC-2

## (undated) DEVICE — PREP CHLORAPREP 26ML TINTED ORANGE  260815

## (undated) DEVICE — SOL NACL 0.9% IRRIG 1000ML BOTTLE 2F7124

## (undated) DEVICE — GLOVE PROTEXIS MICRO 6.5  2D73PM65

## (undated) DEVICE — ESU GROUND PAD ADULT W/CORD E7507

## (undated) DEVICE — PAD CHUX UNDERPAD 30X36" P3036C

## (undated) DEVICE — BAG CLEAR TRASH 1.3M 39X33" P4040C

## (undated) DEVICE — SUCTION CANISTER MEDIVAC LINER 3000ML W/LID 65651-530

## (undated) DEVICE — LINEN BABY BLANKET 5434

## (undated) DEVICE — LINEN HALF SHEET 5512

## (undated) DEVICE — GLOVE PROTEXIS POWDER FREE SMT 6.0  2D72PT60X

## (undated) DEVICE — SOL WATER IRRIG 1000ML BOTTLE 2F7114

## (undated) DEVICE — STOCKING SLEEVE VASOPRESS COMPRESSION CALF MED VP501M

## (undated) DEVICE — DRSG TELFA ISLAND 4X14" 7544

## (undated) DEVICE — TRANSFER DEVICE BLOOD NDL HOLDER 364880